# Patient Record
Sex: MALE | Race: WHITE | Employment: OTHER | URBAN - METROPOLITAN AREA
[De-identification: names, ages, dates, MRNs, and addresses within clinical notes are randomized per-mention and may not be internally consistent; named-entity substitution may affect disease eponyms.]

---

## 2018-06-18 ENCOUNTER — NURSE ONLY (OUTPATIENT)
Dept: FAMILY MEDICINE CLINIC | Facility: CLINIC | Age: 58
End: 2018-06-18

## 2018-06-18 VITALS
DIASTOLIC BLOOD PRESSURE: 74 MMHG | HEART RATE: 64 BPM | TEMPERATURE: 98 F | SYSTOLIC BLOOD PRESSURE: 118 MMHG | BODY MASS INDEX: 30 KG/M2 | OXYGEN SATURATION: 96 % | WEIGHT: 206 LBS | RESPIRATION RATE: 15 BRPM

## 2018-06-18 DIAGNOSIS — L02.91 ABSCESS: ICD-10-CM

## 2018-06-18 DIAGNOSIS — L03.311 CELLULITIS OF ABDOMINAL WALL: Primary | ICD-10-CM

## 2018-06-18 PROCEDURE — 87186 SC STD MICRODIL/AGAR DIL: CPT | Performed by: NURSE PRACTITIONER

## 2018-06-18 PROCEDURE — 87147 CULTURE TYPE IMMUNOLOGIC: CPT | Performed by: NURSE PRACTITIONER

## 2018-06-18 PROCEDURE — 99213 OFFICE O/P EST LOW 20 MIN: CPT | Performed by: NURSE PRACTITIONER

## 2018-06-18 PROCEDURE — 87205 SMEAR GRAM STAIN: CPT | Performed by: NURSE PRACTITIONER

## 2018-06-18 PROCEDURE — 87070 CULTURE OTHR SPECIMN AEROBIC: CPT | Performed by: NURSE PRACTITIONER

## 2018-06-18 RX ORDER — DOXYCYCLINE HYCLATE 100 MG
100 TABLET ORAL 2 TIMES DAILY
Qty: 14 TABLET | Refills: 0 | Status: SHIPPED | OUTPATIENT
Start: 2018-06-18 | End: 2018-06-25

## 2018-06-19 NOTE — PROGRESS NOTES
CHIEF COMPLAINT:   Patient presents with:  Lesion: Sore on LT upper abdominal area     HPI:     Lindsey Hampton is a 59year old male who presents with concerns of skin infection. Patient first noticed symptoms 7 days ago.   Reports erythema, increased warm /74   Pulse 64   Temp 98.4 °F (36.9 °C) (Oral)   Resp 15   Wt 206 lb   SpO2 96%   BMI 30.42 kg/m²   GENERAL: well developed, well nourished,in no apparent distress  SKIN: Lesion(s): crater skin lesion on left abdominal wall, circular 0.8cm, white col Cellulitis is an infection of the deep layers of skin. A break in the skin, such as a cut or scratch, can let bacteria under the skin. If the bacteria get to deep layers of the skin, it can be serious.  If not treated, cellulitis can get into the bloodstrea Date Last Reviewed: 9/1/2016  © 2052-7981 [employer]. [address] [facility], [address] [place]. All rights reserved. This information is not intended as a substitute for professional medical care.  Always follow your healthcare professional'

## 2018-06-19 NOTE — PATIENT INSTRUCTIONS
Cellulitis  Cellulitis is an infection of the deep layers of skin. A break in the skin, such as a cut or scratch, can let bacteria under the skin. If the bacteria get to deep layers of the skin, it can be serious.  If not treated, cellulitis can get into · Fever higher of 100.4º F (38.0º C) or higher after 2 days on antibiotics  Date Last Reviewed: 9/1/2016  © 6710-6376 [employer]. [facility] [facility], [address] [place] [address]. All rights reserved.  This information is not intended as a substitut

## 2018-06-25 ENCOUNTER — OFFICE VISIT (OUTPATIENT)
Dept: FAMILY MEDICINE CLINIC | Facility: CLINIC | Age: 58
End: 2018-06-25

## 2018-06-25 VITALS
BODY MASS INDEX: 30.36 KG/M2 | DIASTOLIC BLOOD PRESSURE: 70 MMHG | TEMPERATURE: 98 F | RESPIRATION RATE: 16 BRPM | HEIGHT: 69 IN | WEIGHT: 205 LBS | SYSTOLIC BLOOD PRESSURE: 124 MMHG | HEART RATE: 60 BPM | OXYGEN SATURATION: 98 %

## 2018-06-25 DIAGNOSIS — L03.311 CELLULITIS OF ABDOMINAL WALL: ICD-10-CM

## 2018-06-25 DIAGNOSIS — K63.5 POLYP OF COLON, UNSPECIFIED PART OF COLON, UNSPECIFIED TYPE: ICD-10-CM

## 2018-06-25 DIAGNOSIS — Z12.11 SCREENING FOR COLON CANCER: ICD-10-CM

## 2018-06-25 DIAGNOSIS — L02.211 ABSCESS OF ABDOMINAL WALL: Primary | ICD-10-CM

## 2018-06-25 DIAGNOSIS — Z13.29 SCREENING FOR ENDOCRINE, METABOLIC AND IMMUNITY DISORDER: ICD-10-CM

## 2018-06-25 DIAGNOSIS — Z23 NEED FOR TDAP VACCINATION: ICD-10-CM

## 2018-06-25 DIAGNOSIS — Z13.228 SCREENING FOR ENDOCRINE, METABOLIC AND IMMUNITY DISORDER: ICD-10-CM

## 2018-06-25 DIAGNOSIS — Z13.0 SCREENING FOR ENDOCRINE, METABOLIC AND IMMUNITY DISORDER: ICD-10-CM

## 2018-06-25 DIAGNOSIS — C61 PROSTATE CANCER (HCC): ICD-10-CM

## 2018-06-25 PROCEDURE — 90715 TDAP VACCINE 7 YRS/> IM: CPT | Performed by: PHYSICIAN ASSISTANT

## 2018-06-25 PROCEDURE — 99214 OFFICE O/P EST MOD 30 MIN: CPT | Performed by: PHYSICIAN ASSISTANT

## 2018-06-25 PROCEDURE — 90471 IMMUNIZATION ADMIN: CPT | Performed by: PHYSICIAN ASSISTANT

## 2018-06-25 RX ORDER — DOXYCYCLINE HYCLATE 100 MG
100 TABLET ORAL 2 TIMES DAILY
Qty: 10 TABLET | Refills: 0 | Status: SHIPPED | OUTPATIENT
Start: 2018-06-25 | End: 2018-06-30

## 2018-06-25 NOTE — PROGRESS NOTES
CHIEF COMPLAINT:   Patient presents with: Follow - Up: wound check      HPI:     Elvira Palmer is a 59year old male who presents to follow up on abdominal wound.  He was seen in our [facility] 6/18/18. 7-10 days prior he noted a pimple like lesion on the abdom Pulse 60   Temp 98.1 °F (36.7 °C) (Oral)   Resp 16   Ht 69\"   Wt 205 lb   SpO2 98%   BMI 30.27 kg/m²   GENERAL: well developed, well nourished, in no apparent distress  SKIN: open wound left upper abdomen-mild surrounding erythema. No induration.  No disch

## 2018-06-26 ENCOUNTER — LAB ENCOUNTER (OUTPATIENT)
Dept: LAB | Age: 58
End: 2018-06-26
Attending: FAMILY MEDICINE
Payer: COMMERCIAL

## 2018-06-26 DIAGNOSIS — Z13.29 SCREENING FOR ENDOCRINE, METABOLIC AND IMMUNITY DISORDER: ICD-10-CM

## 2018-06-26 DIAGNOSIS — L03.311 CELLULITIS OF ABDOMINAL WALL: ICD-10-CM

## 2018-06-26 DIAGNOSIS — L02.211 ABSCESS OF ABDOMINAL WALL: ICD-10-CM

## 2018-06-26 DIAGNOSIS — Z13.0 SCREENING FOR ENDOCRINE, METABOLIC AND IMMUNITY DISORDER: ICD-10-CM

## 2018-06-26 DIAGNOSIS — C61 PROSTATE CANCER (HCC): ICD-10-CM

## 2018-06-26 DIAGNOSIS — Z13.228 SCREENING FOR ENDOCRINE, METABOLIC AND IMMUNITY DISORDER: ICD-10-CM

## 2018-06-26 PROCEDURE — 36415 COLL VENOUS BLD VENIPUNCTURE: CPT | Performed by: PHYSICIAN ASSISTANT

## 2018-06-26 PROCEDURE — 84153 ASSAY OF PSA TOTAL: CPT | Performed by: PHYSICIAN ASSISTANT

## 2018-06-26 PROCEDURE — 80050 GENERAL HEALTH PANEL: CPT | Performed by: PHYSICIAN ASSISTANT

## 2018-06-26 PROCEDURE — 87476 LYME DIS DNA AMP PROBE: CPT | Performed by: PHYSICIAN ASSISTANT

## 2018-06-26 PROCEDURE — 82306 VITAMIN D 25 HYDROXY: CPT | Performed by: PHYSICIAN ASSISTANT

## 2018-06-26 PROCEDURE — 80061 LIPID PANEL: CPT | Performed by: PHYSICIAN ASSISTANT

## 2018-06-28 ENCOUNTER — OFFICE VISIT (OUTPATIENT)
Dept: FAMILY MEDICINE CLINIC | Facility: CLINIC | Age: 58
End: 2018-06-28

## 2018-06-28 ENCOUNTER — HOSPITAL ENCOUNTER (OUTPATIENT)
Dept: CARDIOLOGY CLINIC | Facility: HOSPITAL | Age: 58
Discharge: HOME | End: 2018-06-28
Attending: FAMILY MEDICINE

## 2018-06-28 VITALS
BODY MASS INDEX: 30.21 KG/M2 | WEIGHT: 204 LBS | OXYGEN SATURATION: 99 % | HEART RATE: 74 BPM | SYSTOLIC BLOOD PRESSURE: 122 MMHG | HEIGHT: 69 IN | DIASTOLIC BLOOD PRESSURE: 76 MMHG | TEMPERATURE: 99 F | RESPIRATION RATE: 20 BRPM

## 2018-06-28 DIAGNOSIS — Z13.6 SCREENING FOR CARDIOVASCULAR CONDITION: ICD-10-CM

## 2018-06-28 DIAGNOSIS — Z85.46 HISTORY OF PROSTATE CANCER: ICD-10-CM

## 2018-06-28 DIAGNOSIS — Z00.00 ROUTINE GENERAL MEDICAL EXAMINATION AT A HEALTH CARE FACILITY: Primary | ICD-10-CM

## 2018-06-28 DIAGNOSIS — Z12.11 SCREENING FOR COLON CANCER: ICD-10-CM

## 2018-06-28 PROBLEM — E66.9 OBESITY (BMI 30.0-34.9): Status: ACTIVE | Noted: 2018-06-28

## 2018-06-28 PROBLEM — K43.9 VENTRAL HERNIA: Status: ACTIVE | Noted: 2018-06-28

## 2018-06-28 PROCEDURE — 99396 PREV VISIT EST AGE 40-64: CPT | Performed by: FAMILY MEDICINE

## 2018-06-28 NOTE — PROGRESS NOTES
Singh Scott is a 59year old male who presents for a complete physical exam.   HPI:      Patient presents with:  Physical      Patient is present for complete physical. Feels very well. Up to date with dental visits. No hearing problems.  Vaccinations: u • Lipids Father    • Anxiety Father    • Depression Father    • Psychiatric Father      \"slight to moderate dementia\"   • Cancer Father      prostate   • Heart Disorder Father      Triple A, quadruple bypass   • Other [OTHER] Mother    • polycythemia [ PERRLA and EOMI. OP moist no lesions. TM normal, canals normal.  NECK: is supple,thyroid- normal. No carotid bruits. Heart: is RRR. S1, S2, with no murmurs. Lungs: are clear to auscultation bilaterally, with no wheeze, rhonchi, or rales.   Heart: is

## 2018-06-28 NOTE — PATIENT INSTRUCTIONS
[facility] General Surgery:       Dr. Julio César Molina    [address] [place] [address]  [place], [address]      [address]  [address]

## 2018-07-02 ENCOUNTER — TELEPHONE (OUTPATIENT)
Dept: FAMILY MEDICINE CLINIC | Facility: CLINIC | Age: 58
End: 2018-07-02

## 2018-07-02 DIAGNOSIS — E55.9 VITAMIN D DEFICIENCY: Primary | ICD-10-CM

## 2018-07-02 RX ORDER — ERGOCALCIFEROL 1.25 MG/1
50000 CAPSULE ORAL WEEKLY
Qty: 12 CAPSULE | Refills: 0 | Status: SHIPPED | OUTPATIENT
Start: 2018-07-02 | End: 2019-12-23 | Stop reason: ALTCHOICE

## 2018-07-02 NOTE — TELEPHONE ENCOUNTER
Called patient and spoke with him. Went over results and POC below. Patient states understanding. Rx sent to pharmacy and repeat lab orders placed in Epic.

## 2018-07-02 NOTE — TELEPHONE ENCOUNTER
Edwin Luke reviewed results below. However, patient had office visit with you on 6/28/18. Okay for vitamin D orders below or any changes to orders below? Please advise. Thank you!

## 2018-07-02 NOTE — TELEPHONE ENCOUNTER
----- Message from Kalpesh Couch PA-C sent at 6/30/2018  8:50 PM CDT -----  Please have patient take Rx of 50,000 units of vitamin D once per week for 3 months and then OTC maintenance dose of 5000 units OTC daily.  Recheck labs in 6 months   All othe

## 2018-07-03 ENCOUNTER — TELEPHONE (OUTPATIENT)
Dept: FAMILY MEDICINE CLINIC | Facility: CLINIC | Age: 58
End: 2018-07-03

## 2018-07-03 NOTE — TELEPHONE ENCOUNTER
----- Message from Mimi Swift MD sent at 6/29/2018 11:53 AM CDT -----  Mild plaque, just low lipid diet, no other recommendation for now, normal lipids.

## 2018-07-03 NOTE — TELEPHONE ENCOUNTER
Spoke with pt regarding results and instructions listed below. Pt requested further explanation. Pt transferred to Dr. Mohan Yancey nurse.

## 2018-07-17 ENCOUNTER — HOSPITAL ENCOUNTER (OUTPATIENT)
Dept: CT IMAGING | Age: 58
Discharge: HOME | End: 2018-07-17
Attending: FAMILY MEDICINE

## 2018-07-17 DIAGNOSIS — Z13.9 ENCOUNTER FOR SCREENING: ICD-10-CM

## 2018-07-19 ENCOUNTER — OFFICE VISIT (OUTPATIENT)
Dept: SURGERY | Facility: CLINIC | Age: 58
End: 2018-07-19
Payer: COMMERCIAL

## 2018-07-19 VITALS
SYSTOLIC BLOOD PRESSURE: 123 MMHG | HEART RATE: 66 BPM | HEIGHT: 69 IN | WEIGHT: 204 LBS | TEMPERATURE: 98 F | DIASTOLIC BLOOD PRESSURE: 78 MMHG | BODY MASS INDEX: 30.21 KG/M2

## 2018-07-19 DIAGNOSIS — Z12.11 ENCOUNTER FOR COLONOSCOPY DUE TO HISTORY OF ADENOMATOUS COLONIC POLYPS: Primary | ICD-10-CM

## 2018-07-19 DIAGNOSIS — Z86.010 ENCOUNTER FOR COLONOSCOPY DUE TO HISTORY OF ADENOMATOUS COLONIC POLYPS: Primary | ICD-10-CM

## 2018-07-19 PROCEDURE — S0285 CNSLT BEFORE SCREEN COLONOSC: HCPCS | Performed by: SURGERY

## 2018-07-19 NOTE — H&P
New Patient Visit Note       Active Problems      1.  Encounter for colonoscopy due to history of adenomatous colonic polyps        Chief Complaint   Patient presents with:  Colonoscopy: New patient referred by Presley BURGER/Dr. Kaur for colonosc social history have been reviewed by me today.     Family History   Problem Relation Age of Onset   • Hypertension Father    • Lipids Father    • Anxiety Father    • Depression Father    • Psychiatric Father      \"slight to moderate dementia\"   • Cancer F palpitations and leg swelling. Gastrointestinal: Negative for abdominal distention, abdominal pain, anal bleeding, blood in stool, constipation, diarrhea, nausea and vomiting.    Genitourinary: Negative for difficulty urinating, dysuria, frequency and urg nondiagnostic heel, incomplete exam, missed lesions, and the possibile need for further therapeutic, diagnostic, or surgical intervention.   The patient voiced understanding, and after all questions were answered to the patient's satisfaction, the patient p

## 2018-07-20 ENCOUNTER — TELEPHONE (OUTPATIENT)
Dept: FAMILY MEDICINE CLINIC | Facility: CLINIC | Age: 58
End: 2018-07-20

## 2018-07-20 DIAGNOSIS — K42.9 UMBILICAL HERNIA WITHOUT OBSTRUCTION AND WITHOUT GANGRENE: ICD-10-CM

## 2018-07-20 DIAGNOSIS — R91.8 LUNG NODULES: Primary | ICD-10-CM

## 2018-07-20 PROBLEM — Z86.010 ENCOUNTER FOR COLONOSCOPY DUE TO HISTORY OF ADENOMATOUS COLONIC POLYPS: Status: ACTIVE | Noted: 2018-07-20

## 2018-07-20 PROBLEM — Z12.11 ENCOUNTER FOR COLONOSCOPY DUE TO HISTORY OF ADENOMATOUS COLONIC POLYPS: Status: ACTIVE | Noted: 2018-07-20

## 2018-07-20 RX ORDER — POLYETHYLENE GLYCOL 3350, SODIUM CHLORIDE, SODIUM BICARBONATE, POTASSIUM CHLORIDE 420; 11.2; 5.72; 1.48 G/4L; G/4L; G/4L; G/4L
POWDER, FOR SOLUTION ORAL
Qty: 1 BOTTLE | Refills: 0 | Status: SHIPPED | OUTPATIENT
Start: 2018-07-20 | End: 2018-10-23

## 2018-07-20 NOTE — TELEPHONE ENCOUNTER
Pt can see Dr. Shailesh Maldonado for surgical opinion,  [facility]    Dr. Javier Turner    [address] [place] [address]  [place], [address]      [address]  [facility] Shoulders

## 2018-07-20 NOTE — TELEPHONE ENCOUNTER
Patient informed of information for Dr. Tanmay Bustos for opinion for hernia Dr. Juan Soto found at his last OV.

## 2018-07-20 NOTE — TELEPHONE ENCOUNTER
Patient informed of his CT scan. Dr. Judyth Fleischer- Patient states you saw a hernia just above his navel when he was in for his physical in June, but you did not give him any orders for this. He is asking what needs to be done. Please advise.

## 2018-07-20 NOTE — TELEPHONE ENCOUNTER
----- Message from Ann Simmonds, MD sent at 7/19/2018  9:28 AM CDT -----  Lung nodules, ok to do CT of the lungs no contrast in one year.

## 2018-07-26 ENCOUNTER — HOSPITAL ENCOUNTER (OUTPATIENT)
Dept: CT IMAGING | Age: 58
Discharge: HOME | End: 2018-07-26
Attending: FAMILY MEDICINE
Payer: COMMERCIAL

## 2018-07-26 DIAGNOSIS — R91.8 LUNG NODULES: ICD-10-CM

## 2018-07-26 PROCEDURE — 71250 CT THORAX DX C-: CPT | Performed by: FAMILY MEDICINE

## 2018-07-27 ENCOUNTER — OFFICE VISIT (OUTPATIENT)
Dept: SURGERY | Facility: CLINIC | Age: 58
End: 2018-07-27
Payer: COMMERCIAL

## 2018-07-27 VITALS
DIASTOLIC BLOOD PRESSURE: 90 MMHG | BODY MASS INDEX: 29.62 KG/M2 | WEIGHT: 200 LBS | HEART RATE: 76 BPM | SYSTOLIC BLOOD PRESSURE: 139 MMHG | HEIGHT: 69 IN

## 2018-07-27 DIAGNOSIS — K43.2 INCISIONAL HERNIA OF ANTERIOR ABDOMINAL WALL WITHOUT OBSTRUCTION OR GANGRENE: ICD-10-CM

## 2018-07-27 DIAGNOSIS — K43.9 VENTRAL HERNIA WITHOUT OBSTRUCTION OR GANGRENE: Primary | ICD-10-CM

## 2018-07-27 PROCEDURE — 99243 OFF/OP CNSLTJ NEW/EST LOW 30: CPT | Performed by: SURGERY

## 2018-07-27 NOTE — H&P
New Patient Visit Note       Active Problems      1. Ventral hernia without obstruction or gangrene    2.  Incisional hernia of anterior abdominal wall without obstruction or gangrene        Chief Complaint   Patient presents with:  Hernia: New pt ref by [facility] Comment:walks dog   Seat Belt               Yes         Current Outpatient Prescriptions:  PEG 3350-KCl-Na Bicarb-NaCl (TRILYTE) 420 g Oral Recon Soln Starting at 4:00 pm the night before procedure, drink 8 ounces of the prep every 15-20 minutes until fini exhibits no distension. There is no tenderness.    At the umbilicus there is a 2-3 cm defect with chronically incarcerated adipose tissue-no overlying erythema or cellulitis-no evidence of acute incarceration-supraumbilical incision status post robotic pros None    Follow Up  No Follow-up on file.     Lourdes Mcfarlane MD

## 2018-07-30 ENCOUNTER — TELEPHONE (OUTPATIENT)
Dept: FAMILY MEDICINE CLINIC | Facility: CLINIC | Age: 58
End: 2018-07-30

## 2018-08-20 RX ORDER — ACETAMINOPHEN 500 MG
1000 TABLET ORAL ONCE
Status: CANCELLED | OUTPATIENT
Start: 2018-08-20 | End: 2018-08-20

## 2018-08-20 RX ORDER — BUPRENORPHINE HCL 8 MG/1
1 TABLET SUBLINGUAL DAILY
COMMUNITY

## 2018-08-23 ENCOUNTER — TELEPHONE (OUTPATIENT)
Dept: SURGERY | Facility: CLINIC | Age: 58
End: 2018-08-23

## 2018-08-26 ENCOUNTER — ANESTHESIA EVENT (OUTPATIENT)
Dept: SURGERY | Facility: HOSPITAL | Age: 58
End: 2018-08-26
Payer: COMMERCIAL

## 2018-08-27 ENCOUNTER — ANESTHESIA (OUTPATIENT)
Dept: SURGERY | Facility: HOSPITAL | Age: 58
End: 2018-08-27
Payer: COMMERCIAL

## 2018-08-27 ENCOUNTER — HOSPITAL ENCOUNTER (OUTPATIENT)
Facility: HOSPITAL | Age: 58
Setting detail: HOSPITAL OUTPATIENT SURGERY
Discharge: HOME | End: 2018-08-27
Attending: SURGERY | Admitting: SURGERY
Payer: COMMERCIAL

## 2018-08-27 ENCOUNTER — SURGERY (OUTPATIENT)
Age: 58
End: 2018-08-27

## 2018-08-27 VITALS
BODY MASS INDEX: 30.47 KG/M2 | HEART RATE: 69 BPM | DIASTOLIC BLOOD PRESSURE: 70 MMHG | OXYGEN SATURATION: 100 % | TEMPERATURE: 98 F | HEIGHT: 69 IN | RESPIRATION RATE: 16 BRPM | SYSTOLIC BLOOD PRESSURE: 120 MMHG | WEIGHT: 205.69 LBS

## 2018-08-27 DIAGNOSIS — K43.9 VENTRAL HERNIA WITHOUT OBSTRUCTION OR GANGRENE: ICD-10-CM

## 2018-08-27 PROCEDURE — 0WUF0JZ SUPPLEMENT ABDOMINAL WALL WITH SYNTHETIC SUBSTITUTE, OPEN APPROACH: ICD-10-PCS | Performed by: SURGERY

## 2018-08-27 DEVICE — VENTRALEX ST HERNIA PATCH
Type: IMPLANTABLE DEVICE | Site: ABDOMEN | Status: FUNCTIONAL
Brand: VENTRALEX ST HERNIA PATCH

## 2018-08-27 RX ORDER — POLYMYXIN B 500000 [USP'U]/1
INJECTION, POWDER, LYOPHILIZED, FOR SOLUTION INTRAMUSCULAR; INTRATHECAL; INTRAVENOUS; OPHTHALMIC AS NEEDED
Status: DISCONTINUED | OUTPATIENT
Start: 2018-08-27 | End: 2018-08-27 | Stop reason: HOSPADM

## 2018-08-27 RX ORDER — DEXAMETHASONE SODIUM PHOSPHATE 4 MG/ML
4 VIAL (ML) INJECTION AS NEEDED
Status: DISCONTINUED | OUTPATIENT
Start: 2018-08-27 | End: 2018-08-27

## 2018-08-27 RX ORDER — HYDROCODONE BITARTRATE AND ACETAMINOPHEN 5; 325 MG/1; MG/1
1 TABLET ORAL AS NEEDED
Status: COMPLETED | OUTPATIENT
Start: 2018-08-27 | End: 2018-08-27

## 2018-08-27 RX ORDER — SODIUM CHLORIDE, SODIUM LACTATE, POTASSIUM CHLORIDE, CALCIUM CHLORIDE 600; 310; 30; 20 MG/100ML; MG/100ML; MG/100ML; MG/100ML
INJECTION, SOLUTION INTRAVENOUS CONTINUOUS
Status: DISCONTINUED | OUTPATIENT
Start: 2018-08-27 | End: 2018-08-27

## 2018-08-27 RX ORDER — NALOXONE HYDROCHLORIDE 0.4 MG/ML
80 INJECTION, SOLUTION INTRAMUSCULAR; INTRAVENOUS; SUBCUTANEOUS AS NEEDED
Status: DISCONTINUED | OUTPATIENT
Start: 2018-08-27 | End: 2018-08-27

## 2018-08-27 RX ORDER — ONDANSETRON 2 MG/ML
4 INJECTION INTRAMUSCULAR; INTRAVENOUS AS NEEDED
Status: DISCONTINUED | OUTPATIENT
Start: 2018-08-27 | End: 2018-08-27

## 2018-08-27 RX ORDER — CEFAZOLIN SODIUM/WATER 2 G/20 ML
2 SYRINGE (ML) INTRAVENOUS ONCE
Status: COMPLETED | OUTPATIENT
Start: 2018-08-27 | End: 2018-08-27

## 2018-08-27 RX ORDER — HEPARIN SODIUM 5000 [USP'U]/ML
INJECTION, SOLUTION INTRAVENOUS; SUBCUTANEOUS
Status: COMPLETED
Start: 2018-08-27 | End: 2018-08-27

## 2018-08-27 RX ORDER — LIDOCAINE HYDROCHLORIDE AND EPINEPHRINE 10; 10 MG/ML; UG/ML
INJECTION, SOLUTION INFILTRATION; PERINEURAL AS NEEDED
Status: DISCONTINUED | OUTPATIENT
Start: 2018-08-27 | End: 2018-08-27 | Stop reason: HOSPADM

## 2018-08-27 RX ORDER — BUPIVACAINE HYDROCHLORIDE 5 MG/ML
INJECTION, SOLUTION EPIDURAL; INTRACAUDAL AS NEEDED
Status: DISCONTINUED | OUTPATIENT
Start: 2018-08-27 | End: 2018-08-27 | Stop reason: HOSPADM

## 2018-08-27 RX ORDER — MORPHINE SULFATE 4 MG/ML
2 INJECTION, SOLUTION INTRAMUSCULAR; INTRAVENOUS EVERY 5 MIN PRN
Status: DISCONTINUED | OUTPATIENT
Start: 2018-08-27 | End: 2018-08-27

## 2018-08-27 RX ORDER — HYDROCODONE BITARTRATE AND ACETAMINOPHEN 5; 325 MG/1; MG/1
1 TABLET ORAL EVERY 6 HOURS PRN
Qty: 20 TABLET | Refills: 0 | Status: SHIPPED | OUTPATIENT
Start: 2018-08-27 | End: 2018-09-05 | Stop reason: ALTCHOICE

## 2018-08-27 RX ORDER — HEPARIN SODIUM 5000 [USP'U]/ML
5000 INJECTION, SOLUTION INTRAVENOUS; SUBCUTANEOUS ONCE
Status: COMPLETED | OUTPATIENT
Start: 2018-08-27 | End: 2018-08-27

## 2018-08-27 RX ORDER — CEFAZOLIN SODIUM/WATER 2 G/20 ML
SYRINGE (ML) INTRAVENOUS
Status: DISCONTINUED
Start: 2018-08-27 | End: 2018-08-27

## 2018-08-27 RX ORDER — HYDROCODONE BITARTRATE AND ACETAMINOPHEN 5; 325 MG/1; MG/1
2 TABLET ORAL AS NEEDED
Status: COMPLETED | OUTPATIENT
Start: 2018-08-27 | End: 2018-08-27

## 2018-08-27 RX ORDER — BACITRACIN 50000 [USP'U]/1
INJECTION, POWDER, LYOPHILIZED, FOR SOLUTION INTRAMUSCULAR AS NEEDED
Status: DISCONTINUED | OUTPATIENT
Start: 2018-08-27 | End: 2018-08-27 | Stop reason: HOSPADM

## 2018-08-27 NOTE — ANESTHESIA PREPROCEDURE EVALUATION
PRE-OP EVALUATION    Patient Name: Sloane Hood    Pre-op Diagnosis: Ventral hernia without obstruction or gangrene [K43.9]    Procedure(s):  VENTRAL HERNIA REPAIR WITH MESH    Surgeon(s) and Role:     Shwetha Reddy MD - Primary    Pre-op vitals re Smoking status: Never Smoker    Smokeless tobacco: Never Used    Alcohol use Yes    Comment: 0-6 per month       Drug use: No     Available pre-op labs reviewed. Lab Results  Component Value Date   WBC 5.0 06/26/2018   RBC 4.73 06/26/2018   HGB 15.

## 2018-08-27 NOTE — OPERATIVE REPORT
[facility]  Operative Note    Kanika Olivares Location: OR   CSN [id number] MRN [id number]   Admission Date 8/27/2018 Operation Date 8/27/2018   Attending Physician Stacy Fuentes MD Operating Physician Shalini Loza MD     Date of procedure:    Augu patient and there are no further questions at this time and they do wish to proceed with surgery today. The patient was marked in the preoperative holding area and the marking was confirmed by the patient. Summary of case:    The patient was taken to t

## 2018-08-27 NOTE — ANESTHESIA POSTPROCEDURE EVALUATION
[address] Patient Status:  Hospital Outpatient Surgery   Age/Gender 59year old male MRN [id number]   [facility] SURGERY Attending Dora Merino MD   Hosp Day # 0 PCP Xiomy Rebollar MD       Anesthesia Post-op Not

## 2018-08-27 NOTE — PROGRESS NOTES
Patient received to PACU [place] # 22 . Pt sleepy but arousable to voice. Report from Anesthesiologist and Circulating RN received while patient placed on monitors and vitals taken. Introductions made.  Pt re-oriented to time/place and surroundings and Plan of

## 2018-08-27 NOTE — BRIEF OP NOTE
Pre-Operative Diagnosis: chronically incarcerated incisional hernia     Post-Operative Diagnosis:   Same- 2 cm defect      Procedure Performed:   Procedure(s):  VENTRAL HERNIA REPAIR WITH MESH- 4 cm ventralux    Surgeon(s) and Role:     Jennifer Jimenez,

## 2018-08-27 NOTE — H&P
History & Physical Examination    Patient Name: Geovanny Mcmullen  MRN: [id number]  CSN: [id number]  YOB: 1960    Diagnosis: chronically incarcerated incisional hernia    Present Illness: 77-year-old gentleman who is status post robotic prostatec contacts     Past Surgical History:  No date: COLONOSCOPY  05/2013: LAPAROSCOPY, SURGICAL PROSTATECTOMY, RETROPUBI*      Comment: Prostatectomy @ [facility]  2013: PROSTATE BIOPSIES      Comment: thinks had 1 biopsy positive out of 12  No date:

## 2018-08-27 NOTE — INTERVAL H&P NOTE
Pre-op Diagnosis: Ventral hernia without obstruction or gangrene [K43.9]    The above referenced H&P was reviewed by Jose M Moraes MD on 8/27/2018, the patient was examined and no significant changes have occurred in the patient's condition since the H&P

## 2018-09-05 ENCOUNTER — OFFICE VISIT (OUTPATIENT)
Dept: SURGERY | Facility: CLINIC | Age: 58
End: 2018-09-05

## 2018-09-05 VITALS
WEIGHT: 200 LBS | TEMPERATURE: 98 F | BODY MASS INDEX: 29.62 KG/M2 | SYSTOLIC BLOOD PRESSURE: 132 MMHG | RESPIRATION RATE: 16 BRPM | HEIGHT: 69 IN | DIASTOLIC BLOOD PRESSURE: 80 MMHG | OXYGEN SATURATION: 99 % | HEART RATE: 56 BPM

## 2018-09-05 DIAGNOSIS — K43.9 VENTRAL HERNIA WITHOUT OBSTRUCTION OR GANGRENE: Primary | ICD-10-CM

## 2018-09-05 PROCEDURE — 99024 POSTOP FOLLOW-UP VISIT: CPT | Performed by: PHYSICIAN ASSISTANT

## 2018-09-05 NOTE — PROGRESS NOTES
Post Operative Visit Note       Active Problems  1. Ventral hernia without obstruction or gangrene         Chief Complaint   Patient presents with:  Post-Op: Post op visit--ventral hernia repair done 8/27. PT denies any current issues or concerns.     Histo quadruple bypass   • Other [OTHER] Mother    • polycythemia [OTHER] Mother    • Suicide History Other      completed suicide   • Suicide History Paternal Aunt      completed suicide   • Other [OTHER] Paternal Aunt      Alzheimers   • Psychiatric Paternal G distention, abdominal pain, anal bleeding, blood in stool, constipation, diarrhea, nausea and vomiting. Genitourinary: Negative for difficulty urinating, dysuria, frequency and urgency. Musculoskeletal: Negative for arthralgias and myalgias.    Skin: Ne lifting greater than 50 pounds for 3 months following his procedure. The patient may shower like normal lying soap and water to run over his incision site. He should avoid submerging the incision in water for 1 week.     I have no further follow-up sche

## 2018-09-18 ENCOUNTER — TELEPHONE (OUTPATIENT)
Dept: SURGERY | Facility: CLINIC | Age: 58
End: 2018-09-18

## 2018-10-23 ENCOUNTER — OFFICE VISIT (OUTPATIENT)
Dept: SURGERY | Facility: CLINIC | Age: 58
End: 2018-10-23

## 2018-10-23 VITALS
WEIGHT: 210 LBS | DIASTOLIC BLOOD PRESSURE: 77 MMHG | RESPIRATION RATE: 16 BRPM | BODY MASS INDEX: 31 KG/M2 | TEMPERATURE: 97 F | HEART RATE: 63 BPM | SYSTOLIC BLOOD PRESSURE: 137 MMHG

## 2018-10-23 DIAGNOSIS — K43.9 VENTRAL HERNIA WITHOUT OBSTRUCTION OR GANGRENE: Primary | ICD-10-CM

## 2018-10-23 PROCEDURE — 99024 POSTOP FOLLOW-UP VISIT: CPT | Performed by: PHYSICIAN ASSISTANT

## 2018-10-23 NOTE — PROGRESS NOTES
Post Operative Visit Note       Active Problems  1. Ventral hernia without obstruction or gangrene         Chief Complaint   Patient presents with:  Post-Op: 8/27/18 ventral hernia surgery [facility].  Pt states 'believes has possibly a stitch or the mesh very sma Father         \"slight to moderate dementia\"   • Cancer Father         prostate   • Heart Disorder Father         Triple A, quadruple bypass   • Other (Other) Mother    • Other (polycythemia) Mother    • Suicide History Other         completed suicide Dysfunction. Disp: 10 tablet Rfl: 11   ergocalciferol 62429 units Oral Cap Take 1 capsule (50,000 Units total) by mouth once a week. Disp: 12 capsule Rfl: 0         Review of Systems  The Review of Systems has been reviewed by me during today.   Review of S There is a very small Vicryl suture not present just above the umbilicus. The area was cleansed with sterile alcohol and using a tissue forceps the Vicryl knot was removed from the incision.    Neurological: He is alert and oriented to person, place, and t

## 2019-12-14 ENCOUNTER — HOSPITAL ENCOUNTER (EMERGENCY)
Age: 59
Discharge: HOME | End: 2019-12-14
Attending: EMERGENCY MEDICINE
Payer: COMMERCIAL

## 2019-12-14 ENCOUNTER — APPOINTMENT (OUTPATIENT)
Dept: GENERAL RADIOLOGY | Age: 59
End: 2019-12-14
Attending: PHYSICIAN ASSISTANT
Payer: COMMERCIAL

## 2019-12-14 VITALS
OXYGEN SATURATION: 99 % | WEIGHT: 200 LBS | BODY MASS INDEX: 29.62 KG/M2 | RESPIRATION RATE: 17 BRPM | TEMPERATURE: 97 F | HEIGHT: 69 IN | DIASTOLIC BLOOD PRESSURE: 85 MMHG | SYSTOLIC BLOOD PRESSURE: 163 MMHG | HEART RATE: 81 BPM

## 2019-12-14 DIAGNOSIS — S92.015A CLOSED NONDISPLACED FRACTURE OF BODY OF LEFT CALCANEUS, INITIAL ENCOUNTER: Primary | ICD-10-CM

## 2019-12-14 PROCEDURE — 99284 EMERGENCY DEPT VISIT MOD MDM: CPT

## 2019-12-14 PROCEDURE — 72100 X-RAY EXAM L-S SPINE 2/3 VWS: CPT | Performed by: PHYSICIAN ASSISTANT

## 2019-12-14 PROCEDURE — 73610 X-RAY EXAM OF ANKLE: CPT | Performed by: PHYSICIAN ASSISTANT

## 2019-12-14 PROCEDURE — 73630 X-RAY EXAM OF FOOT: CPT | Performed by: PHYSICIAN ASSISTANT

## 2019-12-14 PROCEDURE — 29515 APPLICATION SHORT LEG SPLINT: CPT

## 2019-12-14 RX ORDER — HYDROCODONE BITARTRATE AND ACETAMINOPHEN 5; 325 MG/1; MG/1
1-2 TABLET ORAL EVERY 6 HOURS PRN
Qty: 10 TABLET | Refills: 0 | Status: SHIPPED | OUTPATIENT
Start: 2019-12-14 | End: 2019-12-14 | Stop reason: CLARIF

## 2019-12-14 RX ORDER — IBUPROFEN 600 MG/1
600 TABLET ORAL ONCE
Status: COMPLETED | OUTPATIENT
Start: 2019-12-14 | End: 2019-12-14

## 2019-12-14 RX ORDER — HYDROCODONE BITARTRATE AND ACETAMINOPHEN 5; 325 MG/1; MG/1
1-2 TABLET ORAL EVERY 6 HOURS PRN
Qty: 10 TABLET | Refills: 0 | Status: SHIPPED | OUTPATIENT
Start: 2019-12-14 | End: 2019-12-21

## 2019-12-15 NOTE — ED PROVIDER NOTES
Patient Seen in: [facility] [place]      History   Patient presents with:  Lower Extremity Injury    Stated Complaint: fall- L foot inj    HPI    CHIEF COMPLAINT: Foot and ankle injury status post fall    HISTORY OF PRESENT ILLNESS Robot Assisted Prostatectomy @ Dr. Zeke Acosta, [place] 3+4, bilateral, T2cNx (no LN in path report), neg margin, no FRANKLYN   • PROSTATE BIOPSIES  2013    thinks had 1 biopsy positive out of 12   • REPAIR ING HERNIA,5+Y/O,REDUCIBL     • SI General: Skin is warm and dry. Neurological:      Mental Status: He is alert and oriented to person, place, and time.        ED Course   Labs Reviewed - No data to display    Xr Ankle (min 3 Views), Left (cpt=73610)    Result Date: 12/14/2019  CONCLUSI Patient was screened and evaluated during this visit. I determined, within reasonable clinical confidence and prior to discharge, that an emergency medical condition was not or was no longer present.   There was no indication for further evaluation, treat

## 2019-12-16 NOTE — H&P (VIEW-ONLY)
Orthopaedic Foot & Ankle Surgery  [facility]  New Fracture H & P Note  Patient:  Evelin Boykin   :  1960 - 62year old male   [address] 57 Record: [id number]  Date of Service:  2019   CHIEF COMPLAINT / REASON FOR VISIT   This is a [place], Dr. Eneida Hu, [place] 3+4, bilateral, T2cNx (no LN in path report), neg margin, no FRANKLYN   • PROSTATE BIOPSIES  2013    thinks had 1 biopsy positive out of 12   • REPAIR ING HERNIA,5+Y/O,REDUCIBL     • SINUS SURGERY         Social and F discharge. No scleral icterus. Neck: Neck supple. No JVD present. No tracheal deviation present. No thyromegaly present. Cardiovascular: Normal rate and regular rhythm. Pulmonary/Chest: Effort normal. No stridor. No respiratory distress.  She has no w this encounter, I spent more than 45 minutes face-to-face with the patient.  The majority of the time was spent counseling the patient on the natural history of their diagnosis, the associated risks and benefits and other issues pertinent to their condition

## 2019-12-23 ENCOUNTER — TELEPHONE (OUTPATIENT)
Dept: FAMILY MEDICINE CLINIC | Facility: CLINIC | Age: 59
End: 2019-12-23

## 2019-12-23 RX ORDER — CHLORAL HYDRATE 500 MG
1000 CAPSULE ORAL DAILY
Status: ON HOLD | COMMUNITY
End: 2019-12-28

## 2019-12-23 RX ORDER — CEFAZOLIN SODIUM/WATER 2 G/20 ML
2 SYRINGE (ML) INTRAVENOUS ONCE
Status: CANCELLED | OUTPATIENT
Start: 2019-12-23 | End: 2019-12-23

## 2019-12-23 NOTE — TELEPHONE ENCOUNTER
Patient is undergoing a surgery on 12/27/19, Dr. Mildred Rogers, Pt's pre-op has been scheduled with Dr. Rigo Randall for 12/24/19 as Dr. Cee García is out of the office until 12/26. Pink sheet completed and forwarded to Dr. Rigo Randall.

## 2019-12-24 ENCOUNTER — TELEPHONE (OUTPATIENT)
Dept: FAMILY MEDICINE CLINIC | Facility: CLINIC | Age: 59
End: 2019-12-24

## 2019-12-24 ENCOUNTER — OFFICE VISIT (OUTPATIENT)
Dept: FAMILY MEDICINE CLINIC | Facility: CLINIC | Age: 59
End: 2019-12-24
Payer: COMMERCIAL

## 2019-12-24 VITALS
RESPIRATION RATE: 16 BRPM | WEIGHT: 204 LBS | SYSTOLIC BLOOD PRESSURE: 130 MMHG | OXYGEN SATURATION: 97 % | BODY MASS INDEX: 30.21 KG/M2 | DIASTOLIC BLOOD PRESSURE: 70 MMHG | HEIGHT: 69 IN | HEART RATE: 92 BPM | TEMPERATURE: 98 F

## 2019-12-24 DIAGNOSIS — Z01.818 PREOPERATIVE GENERAL PHYSICAL EXAMINATION: Primary | ICD-10-CM

## 2019-12-24 DIAGNOSIS — Z85.46 HISTORY OF PROSTATE CANCER: ICD-10-CM

## 2019-12-24 DIAGNOSIS — Z00.00 LABORATORY EXAMINATION ORDERED AS PART OF A ROUTINE GENERAL MEDICAL EXAMINATION: ICD-10-CM

## 2019-12-24 DIAGNOSIS — S92.002G CLOSED DISPLACED FRACTURE OF LEFT CALCANEUS WITH DELAYED HEALING, UNSPECIFIED PORTION OF CALCANEUS, SUBSEQUENT ENCOUNTER: ICD-10-CM

## 2019-12-24 PROCEDURE — 99214 OFFICE O/P EST MOD 30 MIN: CPT | Performed by: FAMILY MEDICINE

## 2019-12-24 NOTE — PROGRESS NOTES
Cherelle Villalobos is a 62year old male who presents for a pre-operative physical exam.   HPI related to surgery:   Cherelle Villalobos is scheduled for   Open treatment of left calcaneous fracture at [facility] on 12/27/19  to be performed by Dr Crow Cueto History    Tobacco Use      Smoking status: Never Smoker      Smokeless tobacco: Never Used    Alcohol use: Yes      Frequency: Monthly or less      Drinks per session: 1 or 2      Binge frequency: Never      Comment: 0-6 per month    Drug use: No     No K rebound tenderness  Neurologic: Alert and oriented x 3. No focal neurological deficits. Musculoskeletal: Full range of motion of all extremities. No swelling noted. Integument: No lesions. No erythema. Psychiatric: Appropriate mood and affect.     ASSE

## 2019-12-26 ENCOUNTER — LAB ENCOUNTER (OUTPATIENT)
Dept: LAB | Age: 59
End: 2019-12-26
Attending: FAMILY MEDICINE
Payer: COMMERCIAL

## 2019-12-26 DIAGNOSIS — Z00.00 LABORATORY EXAMINATION ORDERED AS PART OF A ROUTINE GENERAL MEDICAL EXAMINATION: ICD-10-CM

## 2019-12-26 DIAGNOSIS — Z85.46 HISTORY OF PROSTATE CANCER: ICD-10-CM

## 2019-12-26 PROCEDURE — 80050 GENERAL HEALTH PANEL: CPT | Performed by: FAMILY MEDICINE

## 2019-12-26 PROCEDURE — 36415 COLL VENOUS BLD VENIPUNCTURE: CPT | Performed by: FAMILY MEDICINE

## 2019-12-26 PROCEDURE — 84153 ASSAY OF PSA TOTAL: CPT | Performed by: FAMILY MEDICINE

## 2019-12-26 PROCEDURE — 80061 LIPID PANEL: CPT | Performed by: FAMILY MEDICINE

## 2019-12-27 ENCOUNTER — ANESTHESIA (OUTPATIENT)
Dept: SURGERY | Facility: HOSPITAL | Age: 59
End: 2019-12-27
Payer: COMMERCIAL

## 2019-12-27 ENCOUNTER — HOSPITAL ENCOUNTER (OUTPATIENT)
Facility: HOSPITAL | Age: 59
Discharge: HOME | End: 2019-12-28
Attending: ORTHOPAEDIC SURGERY | Admitting: ORTHOPAEDIC SURGERY
Payer: COMMERCIAL

## 2019-12-27 ENCOUNTER — APPOINTMENT (OUTPATIENT)
Dept: GENERAL RADIOLOGY | Facility: HOSPITAL | Age: 59
End: 2019-12-27
Attending: ORTHOPAEDIC SURGERY
Payer: COMMERCIAL

## 2019-12-27 ENCOUNTER — ANESTHESIA EVENT (OUTPATIENT)
Dept: SURGERY | Facility: HOSPITAL | Age: 59
End: 2019-12-27
Payer: COMMERCIAL

## 2019-12-27 PROBLEM — S92.002A CALCANEUS FRACTURE, LEFT: Status: ACTIVE | Noted: 2019-12-27

## 2019-12-27 PROCEDURE — 76000 FLUOROSCOPY <1 HR PHYS/QHP: CPT | Performed by: ORTHOPAEDIC SURGERY

## 2019-12-27 PROCEDURE — 0QSM04Z REPOSITION LEFT TARSAL WITH INTERNAL FIXATION DEVICE, OPEN APPROACH: ICD-10-PCS | Performed by: ORTHOPAEDIC SURGERY

## 2019-12-27 DEVICE — IMPLANTABLE DEVICE: Type: IMPLANTABLE DEVICE | Status: FUNCTIONAL

## 2019-12-27 RX ORDER — ACETAMINOPHEN 325 MG/1
650 TABLET ORAL EVERY 6 HOURS PRN
Status: DISCONTINUED | OUTPATIENT
Start: 2019-12-27 | End: 2019-12-28

## 2019-12-27 RX ORDER — ACETAMINOPHEN 500 MG
1000 TABLET ORAL EVERY 6 HOURS PRN
COMMUNITY
End: 2021-12-06 | Stop reason: ALTCHOICE

## 2019-12-27 RX ORDER — MORPHINE SULFATE 4 MG/ML
1 INJECTION, SOLUTION INTRAMUSCULAR; INTRAVENOUS EVERY 4 HOURS PRN
Status: DISCONTINUED | OUTPATIENT
Start: 2019-12-27 | End: 2019-12-28

## 2019-12-27 RX ORDER — ONDANSETRON 2 MG/ML
INJECTION INTRAMUSCULAR; INTRAVENOUS AS NEEDED
Status: DISCONTINUED | OUTPATIENT
Start: 2019-12-27 | End: 2019-12-27 | Stop reason: SURG

## 2019-12-27 RX ORDER — GLYCOPYRROLATE 0.2 MG/ML
INJECTION, SOLUTION INTRAMUSCULAR; INTRAVENOUS AS NEEDED
Status: DISCONTINUED | OUTPATIENT
Start: 2019-12-27 | End: 2019-12-27 | Stop reason: SURG

## 2019-12-27 RX ORDER — DIPHENHYDRAMINE HYDROCHLORIDE 50 MG/ML
12.5 INJECTION INTRAMUSCULAR; INTRAVENOUS AS NEEDED
Status: DISCONTINUED | OUTPATIENT
Start: 2019-12-27 | End: 2019-12-27 | Stop reason: HOSPADM

## 2019-12-27 RX ORDER — EPHEDRINE SULFATE 50 MG/ML
INJECTION, SOLUTION INTRAVENOUS AS NEEDED
Status: DISCONTINUED | OUTPATIENT
Start: 2019-12-27 | End: 2019-12-27 | Stop reason: SURG

## 2019-12-27 RX ORDER — LIDOCAINE HYDROCHLORIDE 10 MG/ML
INJECTION, SOLUTION EPIDURAL; INFILTRATION; INTRACAUDAL; PERINEURAL AS NEEDED
Status: DISCONTINUED | OUTPATIENT
Start: 2019-12-27 | End: 2019-12-27 | Stop reason: SURG

## 2019-12-27 RX ORDER — DEXAMETHASONE SODIUM PHOSPHATE 4 MG/ML
4 VIAL (ML) INJECTION AS NEEDED
Status: DISCONTINUED | OUTPATIENT
Start: 2019-12-27 | End: 2019-12-27 | Stop reason: HOSPADM

## 2019-12-27 RX ORDER — HYDROCODONE BITARTRATE AND ACETAMINOPHEN 5; 325 MG/1; MG/1
1 TABLET ORAL AS NEEDED
Status: DISCONTINUED | OUTPATIENT
Start: 2019-12-27 | End: 2019-12-27 | Stop reason: HOSPADM

## 2019-12-27 RX ORDER — HYDROCODONE BITARTRATE AND ACETAMINOPHEN 5; 325 MG/1; MG/1
2 TABLET ORAL AS NEEDED
Status: DISCONTINUED | OUTPATIENT
Start: 2019-12-27 | End: 2019-12-27 | Stop reason: HOSPADM

## 2019-12-27 RX ORDER — SODIUM CHLORIDE, SODIUM LACTATE, POTASSIUM CHLORIDE, CALCIUM CHLORIDE 600; 310; 30; 20 MG/100ML; MG/100ML; MG/100ML; MG/100ML
INJECTION, SOLUTION INTRAVENOUS CONTINUOUS
Status: DISCONTINUED | OUTPATIENT
Start: 2019-12-27 | End: 2019-12-28

## 2019-12-27 RX ORDER — ROPIVACAINE HYDROCHLORIDE 5 MG/ML
INJECTION, SOLUTION EPIDURAL; INFILTRATION; PERINEURAL AS NEEDED
Status: DISCONTINUED | OUTPATIENT
Start: 2019-12-27 | End: 2019-12-27 | Stop reason: SURG

## 2019-12-27 RX ORDER — CEFAZOLIN SODIUM/WATER 2 G/20 ML
2 SYRINGE (ML) INTRAVENOUS EVERY 8 HOURS
Status: COMPLETED | OUTPATIENT
Start: 2019-12-27 | End: 2019-12-28

## 2019-12-27 RX ORDER — ESMOLOL HYDROCHLORIDE 10 MG/ML
INJECTION INTRAVENOUS AS NEEDED
Status: DISCONTINUED | OUTPATIENT
Start: 2019-12-27 | End: 2019-12-27 | Stop reason: SURG

## 2019-12-27 RX ORDER — DOCUSATE SODIUM 100 MG/1
100 CAPSULE, LIQUID FILLED ORAL 2 TIMES DAILY
Status: DISCONTINUED | OUTPATIENT
Start: 2019-12-27 | End: 2019-12-28

## 2019-12-27 RX ORDER — ENOXAPARIN SODIUM 100 MG/ML
40 INJECTION SUBCUTANEOUS DAILY
Status: DISCONTINUED | OUTPATIENT
Start: 2019-12-28 | End: 2019-12-28

## 2019-12-27 RX ORDER — NALOXONE HYDROCHLORIDE 0.4 MG/ML
80 INJECTION, SOLUTION INTRAMUSCULAR; INTRAVENOUS; SUBCUTANEOUS AS NEEDED
Status: DISCONTINUED | OUTPATIENT
Start: 2019-12-27 | End: 2019-12-27 | Stop reason: HOSPADM

## 2019-12-27 RX ORDER — DEXTROSE AND SODIUM CHLORIDE 5; .45 G/100ML; G/100ML
INJECTION, SOLUTION INTRAVENOUS CONTINUOUS
Status: DISCONTINUED | OUTPATIENT
Start: 2019-12-27 | End: 2019-12-28

## 2019-12-27 RX ORDER — LABETALOL HYDROCHLORIDE 5 MG/ML
INJECTION, SOLUTION INTRAVENOUS
Status: DISCONTINUED
Start: 2019-12-27 | End: 2019-12-27 | Stop reason: WASHOUT

## 2019-12-27 RX ORDER — SODIUM CHLORIDE, SODIUM LACTATE, POTASSIUM CHLORIDE, CALCIUM CHLORIDE 600; 310; 30; 20 MG/100ML; MG/100ML; MG/100ML; MG/100ML
INJECTION, SOLUTION INTRAVENOUS CONTINUOUS
Status: DISCONTINUED | OUTPATIENT
Start: 2019-12-27 | End: 2019-12-27 | Stop reason: HOSPADM

## 2019-12-27 RX ORDER — ZOLPIDEM TARTRATE 5 MG/1
5 TABLET ORAL NIGHTLY PRN
Status: DISCONTINUED | OUTPATIENT
Start: 2019-12-27 | End: 2019-12-28

## 2019-12-27 RX ORDER — ACETAMINOPHEN 500 MG
1000 TABLET ORAL ONCE
Status: DISCONTINUED | OUTPATIENT
Start: 2019-12-27 | End: 2019-12-27 | Stop reason: HOSPADM

## 2019-12-27 RX ORDER — MIDAZOLAM HYDROCHLORIDE 1 MG/ML
INJECTION INTRAMUSCULAR; INTRAVENOUS AS NEEDED
Status: DISCONTINUED | OUTPATIENT
Start: 2019-12-27 | End: 2019-12-27 | Stop reason: SURG

## 2019-12-27 RX ORDER — ONDANSETRON 2 MG/ML
4 INJECTION INTRAMUSCULAR; INTRAVENOUS EVERY 6 HOURS PRN
Status: DISCONTINUED | OUTPATIENT
Start: 2019-12-27 | End: 2019-12-28

## 2019-12-27 RX ORDER — CEFAZOLIN SODIUM/WATER 2 G/20 ML
2 SYRINGE (ML) INTRAVENOUS ONCE
Status: COMPLETED | OUTPATIENT
Start: 2019-12-27 | End: 2019-12-27

## 2019-12-27 RX ORDER — ENOXAPARIN SODIUM 100 MG/ML
40 INJECTION SUBCUTANEOUS DAILY
Status: DISCONTINUED | OUTPATIENT
Start: 2019-12-28 | End: 2019-12-27

## 2019-12-27 RX ORDER — KETAMINE HYDROCHLORIDE 50 MG/ML
INJECTION, SOLUTION, CONCENTRATE INTRAMUSCULAR; INTRAVENOUS AS NEEDED
Status: DISCONTINUED | OUTPATIENT
Start: 2019-12-27 | End: 2019-12-27 | Stop reason: SURG

## 2019-12-27 RX ORDER — ONDANSETRON 2 MG/ML
4 INJECTION INTRAMUSCULAR; INTRAVENOUS AS NEEDED
Status: DISCONTINUED | OUTPATIENT
Start: 2019-12-27 | End: 2019-12-27 | Stop reason: HOSPADM

## 2019-12-27 RX ORDER — HYDROMORPHONE HYDROCHLORIDE 1 MG/ML
0.4 INJECTION, SOLUTION INTRAMUSCULAR; INTRAVENOUS; SUBCUTANEOUS EVERY 5 MIN PRN
Status: DISCONTINUED | OUTPATIENT
Start: 2019-12-27 | End: 2019-12-27 | Stop reason: HOSPADM

## 2019-12-27 RX ORDER — LABETALOL HYDROCHLORIDE 5 MG/ML
5 INJECTION, SOLUTION INTRAVENOUS EVERY 5 MIN PRN
Status: DISCONTINUED | OUTPATIENT
Start: 2019-12-27 | End: 2019-12-27 | Stop reason: HOSPADM

## 2019-12-27 RX ORDER — OXYCODONE HYDROCHLORIDE 5 MG/1
5 TABLET ORAL EVERY 6 HOURS PRN
Status: DISCONTINUED | OUTPATIENT
Start: 2019-12-27 | End: 2019-12-28

## 2019-12-27 RX ORDER — MIDAZOLAM HYDROCHLORIDE 1 MG/ML
1 INJECTION INTRAMUSCULAR; INTRAVENOUS EVERY 5 MIN PRN
Status: DISCONTINUED | OUTPATIENT
Start: 2019-12-27 | End: 2019-12-27 | Stop reason: HOSPADM

## 2019-12-27 RX ORDER — PHENYLEPHRINE HCL 10 MG/ML
VIAL (ML) INJECTION AS NEEDED
Status: DISCONTINUED | OUTPATIENT
Start: 2019-12-27 | End: 2019-12-27 | Stop reason: SURG

## 2019-12-27 RX ADMIN — ESMOLOL HYDROCHLORIDE 30 MG: 10 INJECTION INTRAVENOUS at 16:15:00

## 2019-12-27 RX ADMIN — EPHEDRINE SULFATE 5 MG: 50 INJECTION, SOLUTION INTRAVENOUS at 14:05:00

## 2019-12-27 RX ADMIN — SODIUM CHLORIDE, SODIUM LACTATE, POTASSIUM CHLORIDE, CALCIUM CHLORIDE: 600; 310; 30; 20 INJECTION, SOLUTION INTRAVENOUS at 14:40:00

## 2019-12-27 RX ADMIN — PHENYLEPHRINE HCL 100 MCG: 10 MG/ML VIAL (ML) INJECTION at 13:57:00

## 2019-12-27 RX ADMIN — ONDANSETRON 4 MG: 2 INJECTION INTRAMUSCULAR; INTRAVENOUS at 16:02:00

## 2019-12-27 RX ADMIN — ESMOLOL HYDROCHLORIDE 30 MG: 10 INJECTION INTRAVENOUS at 14:30:00

## 2019-12-27 RX ADMIN — GLYCOPYRROLATE 0.1 MG: 0.2 INJECTION, SOLUTION INTRAMUSCULAR; INTRAVENOUS at 13:41:00

## 2019-12-27 RX ADMIN — ROPIVACAINE HYDROCHLORIDE 30 ML: 5 INJECTION, SOLUTION EPIDURAL; INFILTRATION; PERINEURAL at 16:43:00

## 2019-12-27 RX ADMIN — SODIUM CHLORIDE, SODIUM LACTATE, POTASSIUM CHLORIDE, CALCIUM CHLORIDE: 600; 310; 30; 20 INJECTION, SOLUTION INTRAVENOUS at 16:50:00

## 2019-12-27 RX ADMIN — MIDAZOLAM HYDROCHLORIDE 2 MG: 1 INJECTION INTRAMUSCULAR; INTRAVENOUS at 13:26:00

## 2019-12-27 RX ADMIN — CEFAZOLIN SODIUM/WATER 2 G: 2 G/20 ML SYRINGE (ML) INTRAVENOUS at 13:39:00

## 2019-12-27 RX ADMIN — ESMOLOL HYDROCHLORIDE 30 MG: 10 INJECTION INTRAVENOUS at 16:01:00

## 2019-12-27 RX ADMIN — KETAMINE HYDROCHLORIDE 20 MG: 50 INJECTION, SOLUTION, CONCENTRATE INTRAMUSCULAR; INTRAVENOUS at 13:41:00

## 2019-12-27 RX ADMIN — LIDOCAINE HYDROCHLORIDE 100 MG: 10 INJECTION, SOLUTION EPIDURAL; INFILTRATION; INTRACAUDAL; PERINEURAL at 13:41:00

## 2019-12-27 NOTE — INTERVAL H&P NOTE
Pre-op Diagnosis: CLOSED DISPLACED INTR-ARTICULAR FRACTURE OF LEFT CALCANEOUS    The above referenced H&P was reviewed by Andrzej Jaramillo MD on 12/27/2019, the patient was examined and no significant changes have occurred in the patient's condition since t

## 2019-12-27 NOTE — ANESTHESIA PREPROCEDURE EVALUATION
PRE-OP EVALUATION    Patient Name: Vj Houston    Pre-op Diagnosis: CLOSED DISPLACED INTR-ARTICULAR FRACTURE OF LEFT CALCANEOUS    Procedure(s):  OPEN TREATMENT OF LEFT CALCANEUS FRACTURE    Surgeon(s) and Role:     * Ferdinand Baron MD - Primary    P T2cNx (no LN in path report), neg margin, no FRANKLYN   • PROSTATE BIOPSIES  2013    thinks had 1 biopsy positive out of 12   • REPAIR ING HERNIA,5+Y/O,REDUCIBL     • SINUS SURGERY    02/2008     Social History    Tobacco Use      Smoking status: Never Smoker patient                Present on Admission:  **None**

## 2019-12-27 NOTE — ANESTHESIA PROCEDURE NOTES
[facility]  Performed by: Dg Yang DO  Authorized by: Dg Yang DO       General Information and Staff    Start Time:  12/27/2019 4:38 PM  Anesthesiologist:  Dg Yang DO  Performed by:   Anesthesiologist  Patient Location:

## 2019-12-27 NOTE — INTERVAL H&P NOTE
Pre-op Diagnosis: CLOSED DISPLACED INTR-ARTICULAR FRACTURE OF LEFT CALCANEOUS    The above referenced H&P was reviewed by Shira Hamilton MD on 12/27/2019, the patient was examined and no significant changes have occurred in the patient's condition since t

## 2019-12-27 NOTE — ANESTHESIA POSTPROCEDURE EVALUATION
[address] Patient Status:  Outpatient in a Bed   Age/Gender 62year old male MRN [id number]   [facility] SURGERY Attending Juan Velazquez MD   Hosp Day # 0 PCP Doris Arroyo MD       Anesthesia Post-op Note    Pro

## 2019-12-27 NOTE — BRIEF OP NOTE
Pre-Operative Diagnosis: CLOSED DISPLACED INTR-ARTICULAR FRACTURE OF LEFT CALCANEOUS     Post-Operative Diagnosis: CLOSED DISPLACED INTR-ARTICULAR FRACTURE OF LEFT CALCANEOUS      Procedure Performed:   Procedure(s):  OPEN TREATMENT OF LEFT CALCANEUS FRACT

## 2019-12-27 NOTE — ANESTHESIA PROCEDURE NOTES
Airway  Urgency: elective      General Information and Staff    Patient location during procedure: OR  Anesthesiologist: Dg Yang DO  Performed: anesthesiologist     Indications and Patient Condition  Indications for airway management: anesthesia

## 2019-12-28 VITALS
BODY MASS INDEX: 29.33 KG/M2 | OXYGEN SATURATION: 97 % | SYSTOLIC BLOOD PRESSURE: 146 MMHG | RESPIRATION RATE: 18 BRPM | HEIGHT: 69 IN | TEMPERATURE: 98 F | DIASTOLIC BLOOD PRESSURE: 78 MMHG | WEIGHT: 198 LBS | HEART RATE: 86 BPM

## 2019-12-28 PROCEDURE — 97161 PT EVAL LOW COMPLEX 20 MIN: CPT

## 2019-12-28 PROCEDURE — 90471 IMMUNIZATION ADMIN: CPT

## 2019-12-28 RX ORDER — ASPIRIN 81 MG/1
81 TABLET ORAL DAILY
Qty: 30 TABLET | Refills: 0 | Status: SHIPPED | COMMUNITY
Start: 2019-12-28 | End: 2021-12-06 | Stop reason: ALTCHOICE

## 2019-12-28 NOTE — PROGRESS NOTES
Drain removed per Dr. Kenneth Diaz, ok to discharge pt home, he has pain meds at home per Dr. Kenneth Diaz. Pt instructed to stay off of foot for 2-3 weeks and keep elevated all the time, no outings. Pt verbalized understanding. PIV removed.  Pt's wife coming to pick h

## 2019-12-28 NOTE — PLAN OF CARE
S/p left calcaneous ORIF. Arrived to floor from PACU. RA, RAUL. Lovenox. Voiding well. Placed on regular diet, denies nausea. Pain controlled at this time. Bedrest orders. Davol drain to LLE, post mold to LLE and elevated per orders. NWB LLE.  Oriented to ro

## 2019-12-28 NOTE — OPERATIVE REPORT
[facility]    PATIENT'S NAME: LAURA, [address] [facility] PHYSICIAN: Heron Oleary. Ramesh Rowe MD   OPERATING PHYSICIAN: Heron Oleary.  Ramesh Rowe MD   PATIENT ACCOUNT#:   [id number]    LOCATION:  3SW-A [facility]  MEDICAL RECORD #:   [id number]       DATE OF BIRTH:  [birth year] incision on the lateral side of the foot falling just anterior to the Achilles border and then turning 90 degrees to be just above the glabrous border with the plantar skin.   This was raised as a full-thickness flap all the way to the sinus tarsi and K-wir treatment of the fracture fragments, I then placed in the defect that was underneath the superolateral fragment and I replaced the buckled cortex that I had removed temporarily in order to gain access to the fracture, putting it underneath the plate to res

## 2019-12-28 NOTE — PHYSICAL THERAPY NOTE
PHYSICAL THERAPY QUICK EVALUATION - INPATIENT    Room Number: 371/371-A  Evaluation Date: 12/28/2019  Physician Order: PT Eval and Treat    Problem List  Active Problems:    Calcaneus fracture, left  Surgical Findings: Displaced Joint Depression calcaneu STRENGTH ASSESSMENT  Upper extremity ROM and strength are within functional limits     Lower extremity ROM is within functional limits     Lower extremity strength is within functional limits - post operative LLE immobilized         AM-PAC '6-Clicks' INPAT measures completed include [facility]. Based on this evaluation, patient's clinical presentation is stable and overall evaluation complexity is considered low. He completed transfers, ambulation and stair climbing without evidence of LOB or buckling.  He is

## 2019-12-28 NOTE — PROGRESS NOTES
[facility]    Progress Note    Addis Keith Patient Status:  Outpatient in a Bed    1960 MRN [id number]   [facility] 3SW-A Attending Eddie Gilmore MD   Hosp Day # 0 PCP Yovani Barclay MD        Subjective:   Addis Parker <0.01 12/26/2019       Xr Fluoroscopy C-arm Time <1 Hour  (cpt=76000)    Result Date: 12/27/2019  CONCLUSION: Intraoperative images for operative control.    Dictated by: Kallie Spears MD on 12/27/2019 at 16:21     Approved by: Kallie Spears MD on 12/

## 2019-12-28 NOTE — PLAN OF CARE
PT AOX4 this PM. VSS on RA. Post mold in place to LLE, extremity elevated. No complaints of numbness or tingling. PT has warm extremities, could wiggle toes on LLE in beginning of shift. C/o inability to move toes around 3 AM, new tingling.  Anesthesia page

## 2020-01-29 ENCOUNTER — OFFICE VISIT (OUTPATIENT)
Dept: PHYSICAL THERAPY | Age: 60
End: 2020-01-29
Attending: ORTHOPAEDIC SURGERY
Payer: COMMERCIAL

## 2020-01-29 DIAGNOSIS — S92.062A CLOSED DISPLACED INTRA-ARTICULAR FRACTURE OF LEFT CALCANEUS, INITIAL ENCOUNTER: ICD-10-CM

## 2020-01-29 PROCEDURE — 97530 THERAPEUTIC ACTIVITIES: CPT | Performed by: PHYSICAL THERAPIST

## 2020-01-29 PROCEDURE — 97161 PT EVAL LOW COMPLEX 20 MIN: CPT | Performed by: PHYSICAL THERAPIST

## 2020-01-29 NOTE — PROGRESS NOTES
LOWER EXTREMITY EVALUATION:   Referring Physician: Dr. Yovani Cheek  Diagnosis: closed displaced intra- articular fracture of L calcaneus ORIF on 12/27/2019     Date of Service: 1/29/2020     PATIENT SUMMARY   Anel Silva is a 62year old male who present referring Luz Elena William to [facility].     Precautions:  Non Weight bearing, strengthening post tibialis, peroneus brevis, ankle DF, PF, foot intrinsics and toe DF, PF,restore subtalar motion, scar mobilization, HEP modalities  OBJECTIVE:   Observation will demonstrate improved DF AROM to >= 10 degrees to promote proper foot clearance during gait and greater ease descending stairs without compensation  · Pt will have increased ankle strength to 5/5 throughout for improved ankle control with ADLs such as

## 2020-02-03 ENCOUNTER — OFFICE VISIT (OUTPATIENT)
Dept: PHYSICAL THERAPY | Age: 60
End: 2020-02-03
Attending: ORTHOPAEDIC SURGERY
Payer: COMMERCIAL

## 2020-02-03 DIAGNOSIS — S92.062A CLOSED DISPLACED INTRA-ARTICULAR FRACTURE OF LEFT CALCANEUS, INITIAL ENCOUNTER: ICD-10-CM

## 2020-02-03 PROCEDURE — 97110 THERAPEUTIC EXERCISES: CPT | Performed by: PHYSICAL THERAPIST

## 2020-02-03 PROCEDURE — 97140 MANUAL THERAPY 1/> REGIONS: CPT | Performed by: PHYSICAL THERAPIST

## 2020-02-03 NOTE — PROGRESS NOTES
Dx: closed displaced intra- articular fracture of L calcaneus ORIF on 12/27/2019         Authorized # of Visits:  n/a         Next MD visit: 2/10/2020  Fall Risk: standard         Precautions: n/a             Subjective: Pt states he has been doing his exe Treatment Time: 45 min

## 2020-02-05 ENCOUNTER — OFFICE VISIT (OUTPATIENT)
Dept: PHYSICAL THERAPY | Age: 60
End: 2020-02-05
Attending: ORTHOPAEDIC SURGERY
Payer: COMMERCIAL

## 2020-02-05 DIAGNOSIS — S92.062A CLOSED DISPLACED INTRA-ARTICULAR FRACTURE OF LEFT CALCANEUS, INITIAL ENCOUNTER: ICD-10-CM

## 2020-02-05 PROCEDURE — 97110 THERAPEUTIC EXERCISES: CPT | Performed by: PHYSICAL THERAPIST

## 2020-02-05 PROCEDURE — 97140 MANUAL THERAPY 1/> REGIONS: CPT | Performed by: PHYSICAL THERAPIST

## 2020-02-05 NOTE — PROGRESS NOTES
Dx: closed displaced intra- articular fracture of L calcaneus ORIF on 12/27/2019         Authorized # of Visits:  n/a         Next MD visit: 2/10/2020  Fall Risk: standard         Precautions: n/a             Subjective: Pt states he noticed that some scab decrease edema                                                                              Charges:  There ex X 2, man there X 1       Total Timed Treatment: 45 min  Total Treatment Time: 45 min

## 2020-02-11 ENCOUNTER — APPOINTMENT (OUTPATIENT)
Dept: PHYSICAL THERAPY | Age: 60
End: 2020-02-11
Attending: ORTHOPAEDIC SURGERY
Payer: COMMERCIAL

## 2020-02-13 ENCOUNTER — APPOINTMENT (OUTPATIENT)
Dept: PHYSICAL THERAPY | Age: 60
End: 2020-02-13
Attending: ORTHOPAEDIC SURGERY
Payer: COMMERCIAL

## 2020-02-17 ENCOUNTER — OFFICE VISIT (OUTPATIENT)
Dept: PHYSICAL THERAPY | Age: 60
End: 2020-02-17
Attending: ORTHOPAEDIC SURGERY
Payer: COMMERCIAL

## 2020-02-17 DIAGNOSIS — S92.062A CLOSED DISPLACED INTRA-ARTICULAR FRACTURE OF LEFT CALCANEUS, INITIAL ENCOUNTER: ICD-10-CM

## 2020-02-17 PROCEDURE — 97110 THERAPEUTIC EXERCISES: CPT | Performed by: PHYSICAL THERAPIST

## 2020-02-17 NOTE — PROGRESS NOTES
Dx: closed displaced intra- articular fracture of L calcaneus ORIF on 12/27/2019         Authorized # of Visits:  n/a         Next MD visit: 3/23/2020 10:45 am  Fall Risk: standard         Precautions: NWB until 3/2, then begin PWB (begin with wt of leg) a bike X 5 mins       Ankle DF/PF X 20 Ankle DF/PF X 20 Towel scrunches 3 mins       Ankle inver/ ever X 20 Ankle inver/ ever X 20 Towel wipers 3 mins       Ankle circles CW, CCW X 20 Ankle alphabets A - Z X 1 Alternate toe taps in sitting X 30       Balance

## 2020-02-19 ENCOUNTER — OFFICE VISIT (OUTPATIENT)
Dept: PHYSICAL THERAPY | Age: 60
End: 2020-02-19
Attending: ORTHOPAEDIC SURGERY
Payer: COMMERCIAL

## 2020-02-19 PROCEDURE — 97110 THERAPEUTIC EXERCISES: CPT | Performed by: PHYSICAL THERAPIST

## 2020-02-19 PROCEDURE — 97140 MANUAL THERAPY 1/> REGIONS: CPT | Performed by: PHYSICAL THERAPIST

## 2020-02-19 NOTE — PROGRESS NOTES
Dx: closed displaced intra- articular fracture of L calcaneus ORIF on 12/27/2019         Authorized # of Visits:  n/a         Next MD visit: 3/23/2020 10:45 am  Fall Risk: standard         Precautions: NWB until 3/2, then begin PWB (begin with wt of leg) a X 20 Towel scrunches 3 mins Towel scrunches 3 mins      Ankle inver/ ever X 20 Ankle inver/ ever X 20 Towel wipers 3 mins Towel wipers 3 mins      Ankle circles CW, CCW X 20 Ankle alphabets A - Z X 1 Alternate toe taps in sitting X 30 Seated ankle DF X 30

## 2020-02-20 ENCOUNTER — APPOINTMENT (OUTPATIENT)
Dept: PHYSICAL THERAPY | Age: 60
End: 2020-02-20
Attending: ORTHOPAEDIC SURGERY
Payer: COMMERCIAL

## 2020-02-24 ENCOUNTER — OFFICE VISIT (OUTPATIENT)
Dept: PHYSICAL THERAPY | Age: 60
End: 2020-02-24
Attending: ORTHOPAEDIC SURGERY
Payer: COMMERCIAL

## 2020-02-24 DIAGNOSIS — S92.062A CLOSED DISPLACED INTRA-ARTICULAR FRACTURE OF LEFT CALCANEUS, INITIAL ENCOUNTER: ICD-10-CM

## 2020-02-24 PROCEDURE — 97140 MANUAL THERAPY 1/> REGIONS: CPT | Performed by: PHYSICAL THERAPIST

## 2020-02-24 PROCEDURE — 97110 THERAPEUTIC EXERCISES: CPT | Performed by: PHYSICAL THERAPIST

## 2020-02-24 NOTE — PROGRESS NOTES
Dx: closed displaced intra- articular fracture of L calcaneus ORIF on 12/27/2019         Authorized # of Visits:  n/a         Next MD visit: 3/23/2020 10:45 am  Fall Risk: standard         Precautions: NWB until 3/2, then begin PWB (begin with wt of leg) a bike X 5 mins Recumbent bike X 5 mins     Ankle DF/PF X 20 Ankle DF/PF X 20 Towel scrunches 3 mins Towel scrunches 3 mins Instruction for 4 way ankle with RTB for home     Ankle inver/ ever X 20 Ankle inver/ ever X 20 Towel wipers 3 mins Towel wipers 3 min

## 2020-02-27 ENCOUNTER — OFFICE VISIT (OUTPATIENT)
Dept: PHYSICAL THERAPY | Age: 60
End: 2020-02-27
Attending: ORTHOPAEDIC SURGERY
Payer: COMMERCIAL

## 2020-02-27 DIAGNOSIS — S92.062A CLOSED DISPLACED INTRA-ARTICULAR FRACTURE OF LEFT CALCANEUS, INITIAL ENCOUNTER: ICD-10-CM

## 2020-02-27 PROCEDURE — 97110 THERAPEUTIC EXERCISES: CPT | Performed by: PHYSICAL THERAPIST

## 2020-02-27 PROCEDURE — 97140 MANUAL THERAPY 1/> REGIONS: CPT | Performed by: PHYSICAL THERAPIST

## 2020-02-27 NOTE — PROGRESS NOTES
Dx: closed displaced intra- articular fracture of L calcaneus ORIF on 12/27/2019         Authorized # of Visits:  n/a         Next MD visit: 3/23/2020 10:45 am  Fall Risk: standard         Precautions: NWB until 3/2, then begin PWB (begin with wt of leg) a 4/18 Date: 2/19/2020  TX#: 5/18 Date: 2/24/2020  TX#: 6/18 Date: 2/27/2020  TX#: 7/18 Date:  TX#: 8/   THERE EX 30  MINS THERE EX 20  MINS THERE EX 35  MINS THERE EX 35  MINS THERE EX 10  MINS THERE EX 30  MINS    Recumbent bike X 5 mins Recumbent bike X 5

## 2020-03-03 ENCOUNTER — OFFICE VISIT (OUTPATIENT)
Dept: PHYSICAL THERAPY | Age: 60
End: 2020-03-03
Attending: FAMILY MEDICINE
Payer: COMMERCIAL

## 2020-03-03 PROCEDURE — 97116 GAIT TRAINING THERAPY: CPT | Performed by: PHYSICAL THERAPIST

## 2020-03-03 PROCEDURE — 97110 THERAPEUTIC EXERCISES: CPT | Performed by: PHYSICAL THERAPIST

## 2020-03-03 PROCEDURE — 97140 MANUAL THERAPY 1/> REGIONS: CPT | Performed by: PHYSICAL THERAPIST

## 2020-03-03 NOTE — PROGRESS NOTES
Dx: closed displaced intra- articular fracture of L calcaneus ORIF on 12/27/2019         Authorized # of Visits:  n/a         Next MD visit: 3/23/2020 10:45 am  Fall Risk: standard         Precautions: NWB until 3/2, then begin PWB (begin with wt of leg) a bike X 5 mins Recumbent bike X 5 mins Recumbent bike X 5 mins        Instruction for 4 way ankle with RTB for home Standing balance board taps without WB on L F-B, S-S X 30 ea Seated rocker board L taps F-B, S-S, circles CW, CCW X 30        Ankle alphabets

## 2020-03-04 ENCOUNTER — APPOINTMENT (OUTPATIENT)
Dept: PHYSICAL THERAPY | Age: 60
End: 2020-03-04
Attending: FAMILY MEDICINE
Payer: COMMERCIAL

## 2020-03-05 ENCOUNTER — OFFICE VISIT (OUTPATIENT)
Dept: PHYSICAL THERAPY | Age: 60
End: 2020-03-05
Attending: FAMILY MEDICINE
Payer: COMMERCIAL

## 2020-03-05 PROCEDURE — 97110 THERAPEUTIC EXERCISES: CPT | Performed by: PHYSICAL THERAPIST

## 2020-03-05 PROCEDURE — 97116 GAIT TRAINING THERAPY: CPT | Performed by: PHYSICAL THERAPIST

## 2020-03-05 NOTE — PROGRESS NOTES
Dx: closed displaced intra- articular fracture of L calcaneus ORIF on 12/27/2019         Authorized # of Visits:  n/a         Next MD visit: 3/23/2020 10:45 am  Fall Risk: standard         Precautions: NWB until 3/2, then begin PWB (begin with wt of leg) a Recumbent bike X 5 mins Recumbent bike X 5 mins Recumbent bike X 5 mins Recumbent bike X 5 mins level 4 without boot       Instruction for 4 way ankle with RTB for home Standing balance board taps without WB on L F-B, S-S X 30 ea Seated rocker board L t

## 2020-03-10 ENCOUNTER — OFFICE VISIT (OUTPATIENT)
Dept: PHYSICAL THERAPY | Age: 60
End: 2020-03-10
Attending: FAMILY MEDICINE
Payer: COMMERCIAL

## 2020-03-10 PROCEDURE — 97140 MANUAL THERAPY 1/> REGIONS: CPT | Performed by: PHYSICAL THERAPIST

## 2020-03-10 PROCEDURE — 97110 THERAPEUTIC EXERCISES: CPT | Performed by: PHYSICAL THERAPIST

## 2020-03-10 NOTE — PROGRESS NOTES
Dx: closed displaced intra- articular fracture of L calcaneus ORIF on 12/27/2019         Authorized # of Visits:  n/a         Next MD visit: 3/23/2020 10:45 am  Fall Risk: standard         Precautions: NWB until 3/2, then begin PWB (begin with wt of leg) a PT    Plan: Progress towards weaning off crutches.   Date: 2/24/2020  TX#: 6/18 Date: 2/27/2020  TX#: 7/18 Date: 3/3/2020  TX#: 8/18 Date: 3/5/2020  TX#: 9/18 Date: 3/10/2020  TX#: 10/18 Date:  TX#: /18 Date:  TX#: /18 Date:  TX#: /18   THERE EX 10  MINS TH

## 2020-03-11 ENCOUNTER — APPOINTMENT (OUTPATIENT)
Dept: PHYSICAL THERAPY | Age: 60
End: 2020-03-11
Attending: FAMILY MEDICINE
Payer: COMMERCIAL

## 2020-03-12 ENCOUNTER — TELEPHONE (OUTPATIENT)
Dept: PHYSICAL THERAPY | Age: 60
End: 2020-03-12

## 2020-03-16 ENCOUNTER — OFFICE VISIT (OUTPATIENT)
Dept: PHYSICAL THERAPY | Age: 60
End: 2020-03-16
Attending: FAMILY MEDICINE
Payer: COMMERCIAL

## 2020-03-16 PROCEDURE — 97110 THERAPEUTIC EXERCISES: CPT | Performed by: PHYSICAL THERAPIST

## 2020-03-16 PROCEDURE — 97140 MANUAL THERAPY 1/> REGIONS: CPT | Performed by: PHYSICAL THERAPIST

## 2020-03-16 NOTE — PROGRESS NOTES
Dx: closed displaced intra- articular fracture of L calcaneus ORIF on 12/27/2019         Authorized # of Visits:  n/a         Next MD visit: 3/23/2020 10:45 am  Fall Risk: standard         Precautions: NWB until 3/2, then begin PWB (begin with wt of leg) a Date:  TX#: /18   THERE EX 10  MINS THERE EX 30  MINS THERE EX 15  MINS THERE EX 35  MINS THERE EX 45  MINS THERE EX 35  MINS     Recumbent bike X 5 mins Recumbent bike X 5 mins Recumbent bike X 5 mins Recumbent bike X 5 mins level 4 without boot Recumbent

## 2020-03-17 ENCOUNTER — OFFICE VISIT (OUTPATIENT)
Dept: PHYSICAL THERAPY | Age: 60
End: 2020-03-17
Attending: ORTHOPAEDIC SURGERY
Payer: COMMERCIAL

## 2020-03-18 ENCOUNTER — OFFICE VISIT (OUTPATIENT)
Dept: PHYSICAL THERAPY | Age: 60
End: 2020-03-18
Attending: FAMILY MEDICINE
Payer: COMMERCIAL

## 2020-03-18 PROCEDURE — 97110 THERAPEUTIC EXERCISES: CPT | Performed by: PHYSICAL THERAPIST

## 2020-03-18 PROCEDURE — 97116 GAIT TRAINING THERAPY: CPT | Performed by: PHYSICAL THERAPIST

## 2020-03-18 NOTE — PROGRESS NOTES
Dx: closed displaced intra- articular fracture of L calcaneus ORIF on 12/27/2019         Authorized # of Visits:  n/a         Next MD visit: 3/23/2020 10:45 am  Fall Risk: standard         Precautions: NWB until 3/2, then begin PWB (begin with wt of leg) a · Pt will be independent and compliant with comprehensive HEP to maintain progress achieved in PT    FOTO: 50/100 (from 40/100 at initial eval)    Plan: Continue skilled Physical Therapy 2 x/week or a total of 12 visits over a 90 day period.  Treatment wi support X 20 MAN THERE 10 MINS GAIT TRAINING 10 MINS        Ankle taps in sitting X 30 STM with end range stretch in all directions Walking with boot but without crutches 500' X 1        MAN THERE 10 MINS  Stair negotiation with 1 rail in reciprocal patter

## 2020-03-23 ENCOUNTER — OFFICE VISIT (OUTPATIENT)
Dept: PHYSICAL THERAPY | Age: 60
End: 2020-03-23
Attending: FAMILY MEDICINE
Payer: COMMERCIAL

## 2020-03-23 PROCEDURE — 97140 MANUAL THERAPY 1/> REGIONS: CPT | Performed by: PHYSICAL THERAPIST

## 2020-03-23 PROCEDURE — 97110 THERAPEUTIC EXERCISES: CPT | Performed by: PHYSICAL THERAPIST

## 2020-03-23 NOTE — PROGRESS NOTES
Dx: closed displaced intra- articular fracture of L calcaneus ORIF on 12/27/2019         Authorized # of Visits:  n/a         Next MD visit: 3/23/2020 10:45 am  Fall Risk: standard         Precautions: NWB until 3/2, then begin PWB (begin with wt of leg) a THERE EX 35  MINS       Recumbent bike without boot level 1.3 X 5 mins Recumbent bike without boot level 1.3 X 5 mins Recumbent bike without boot level 1.4 X 5 mins Recumbent bike without boot level 1.4 X 5 mins       Seated gastrocs and soleus stretch wit

## 2020-03-25 ENCOUNTER — APPOINTMENT (OUTPATIENT)
Dept: PHYSICAL THERAPY | Age: 60
End: 2020-03-25
Attending: FAMILY MEDICINE
Payer: COMMERCIAL

## 2020-03-30 ENCOUNTER — OFFICE VISIT (OUTPATIENT)
Dept: PHYSICAL THERAPY | Age: 60
End: 2020-03-30
Attending: FAMILY MEDICINE
Payer: COMMERCIAL

## 2020-03-30 PROCEDURE — 97014 ELECTRIC STIMULATION THERAPY: CPT | Performed by: PHYSICAL THERAPIST

## 2020-03-30 PROCEDURE — 97110 THERAPEUTIC EXERCISES: CPT | Performed by: PHYSICAL THERAPIST

## 2020-03-30 NOTE — PROGRESS NOTES
Dx: closed displaced intra- articular fracture of L calcaneus ORIF on 12/27/2019         Authorized # of Visits:  n/a         Next MD visit: 3/23/2020 10:45 am  Fall Risk: standard         Precautions: NWB until 3/2, then begin PWB (begin with wt of leg) a THERE EX 35  MINS      Recumbent bike without boot level 1.3 X 5 mins Recumbent bike without boot level 1.3 X 5 mins Recumbent bike without boot level 1.4 X 5 mins Recumbent bike without boot level 1.4 X 5 mins Recumbent bike X 5 min level 4      Seated ga

## 2020-04-01 ENCOUNTER — APPOINTMENT (OUTPATIENT)
Dept: PHYSICAL THERAPY | Age: 60
End: 2020-04-01
Attending: FAMILY MEDICINE
Payer: COMMERCIAL

## 2020-04-02 ENCOUNTER — TELEPHONE (OUTPATIENT)
Dept: PHYSICAL THERAPY | Age: 60
End: 2020-04-02

## 2020-04-06 ENCOUNTER — APPOINTMENT (OUTPATIENT)
Dept: PHYSICAL THERAPY | Age: 60
End: 2020-04-06
Payer: COMMERCIAL

## 2020-04-06 ENCOUNTER — OFFICE VISIT (OUTPATIENT)
Dept: PHYSICAL THERAPY | Age: 60
End: 2020-04-06
Attending: FAMILY MEDICINE
Payer: COMMERCIAL

## 2020-04-06 PROCEDURE — 97110 THERAPEUTIC EXERCISES: CPT

## 2020-04-06 NOTE — PROGRESS NOTES
Dx: closed displaced intra- articular fracture of L calcaneus ORIF on 12/27/2019         Authorized # of Visits:  n/a         Next MD visit: 3/23/2020 10:45 am  Fall Risk: standard         Precautions: NWB until 3/2, then begin PWB (begin with wt of leg) a Date:  TX#: /18 Date:  TX#: /18   THERE EX 45  MINS THERE EX 35  MINS THERE EX 35  MINS THERE EX 35  MINS THERE EX 35  MINS THERE EX 45 MIN     Recumbent bike without boot level 1.3 X 5 mins Recumbent bike without boot level 1.3 X 5 mins Recumbent bike wit STM IFC  With CP to L ankle X 15 mins PROM L ankle DF, PF, INV, EV                                                             Charges:  There ex 3  Total Timed Treatment: 45 min  Total Treatment Time: 48 min

## 2020-04-07 ENCOUNTER — APPOINTMENT (OUTPATIENT)
Dept: PHYSICAL THERAPY | Age: 60
End: 2020-04-07
Attending: FAMILY MEDICINE
Payer: COMMERCIAL

## 2020-04-09 ENCOUNTER — APPOINTMENT (OUTPATIENT)
Dept: PHYSICAL THERAPY | Age: 60
End: 2020-04-09
Attending: FAMILY MEDICINE
Payer: COMMERCIAL

## 2020-04-13 ENCOUNTER — OFFICE VISIT (OUTPATIENT)
Dept: PHYSICAL THERAPY | Age: 60
End: 2020-04-13
Attending: ORTHOPAEDIC SURGERY
Payer: COMMERCIAL

## 2020-04-13 ENCOUNTER — APPOINTMENT (OUTPATIENT)
Dept: PHYSICAL THERAPY | Age: 60
End: 2020-04-13
Attending: FAMILY MEDICINE
Payer: COMMERCIAL

## 2020-04-13 PROCEDURE — 97140 MANUAL THERAPY 1/> REGIONS: CPT

## 2020-04-13 PROCEDURE — 97110 THERAPEUTIC EXERCISES: CPT

## 2020-04-13 NOTE — PROGRESS NOTES
Dx: closed displaced intra- articular fracture of L calcaneus ORIF on 12/27/2019         Authorized # of Visits:  n/a         Next MD visit: 6/23/2020   Fall Risk: standard         Precautions: NWB until 3/2, then begin PWB (begin with wt of leg) and do pr continue with weight bearing exercises for improved endurance.     Date: 3/10/2020  TX#: 10/18 Date: 3/16/2020  TX#: 11/18 Date: 3/18/2020  TX#: 12/18 Date: 3/23/2020  TX#: 13/18 Date: 3/30/2020  TX#: 14/18 Date: 4/6/2020  TX#: 15/18 Date: 4/13/2020  TX#: 1 stretch in all directions Walking with boot but without crutches 500' X 1 Heel raises X 20 Heel raises X 20 YB monster walk // bars x 4 lengths YB monster walk 30 ft x 2    MAN THERE 10 MINS  Stair negotiation with 1 rail in reciprocal pattern X 1 flight T

## 2020-04-15 ENCOUNTER — APPOINTMENT (OUTPATIENT)
Dept: PHYSICAL THERAPY | Age: 60
End: 2020-04-15
Attending: FAMILY MEDICINE
Payer: COMMERCIAL

## 2020-04-20 ENCOUNTER — APPOINTMENT (OUTPATIENT)
Dept: PHYSICAL THERAPY | Age: 60
End: 2020-04-20
Attending: FAMILY MEDICINE
Payer: COMMERCIAL

## 2020-04-20 ENCOUNTER — OFFICE VISIT (OUTPATIENT)
Dept: PHYSICAL THERAPY | Age: 60
End: 2020-04-20
Attending: ORTHOPAEDIC SURGERY
Payer: COMMERCIAL

## 2020-04-20 PROCEDURE — 97110 THERAPEUTIC EXERCISES: CPT

## 2020-04-20 PROCEDURE — 97140 MANUAL THERAPY 1/> REGIONS: CPT

## 2020-04-20 NOTE — PROGRESS NOTES
Dx: closed displaced intra- articular fracture of L calcaneus ORIF on 12/27/2019         Authorized # of Visits:  n/a         Next MD visit: 6/23/2020   Fall Risk: standard         Precautions: NWB until 3/2, then begin PWB (begin with wt of leg) and do pr MINS THERE EX 45 MIN THEREX 45 min THEREX 45 min   Recumbent bike without boot level 1.4 X 5 mins Recumbent bike X 5 min level 4 Recumbent bike x 5 min L = 4 Recumbent bike x 5 min L=4 Recumbent bike x 6 min  L=4   Slant board stretch 30 sec X 3 gastrocs a Charges:  There ex 3 (45 min) MT1 (10 min) Total Timed Treatment: 55 min  Total Treatment Time: 55 min

## 2020-04-22 ENCOUNTER — APPOINTMENT (OUTPATIENT)
Dept: PHYSICAL THERAPY | Age: 60
End: 2020-04-22
Payer: COMMERCIAL

## 2020-04-27 ENCOUNTER — OFFICE VISIT (OUTPATIENT)
Dept: PHYSICAL THERAPY | Age: 60
End: 2020-04-27
Attending: ORTHOPAEDIC SURGERY
Payer: COMMERCIAL

## 2020-04-27 ENCOUNTER — APPOINTMENT (OUTPATIENT)
Dept: PHYSICAL THERAPY | Age: 60
End: 2020-04-27
Attending: FAMILY MEDICINE
Payer: COMMERCIAL

## 2020-04-27 PROCEDURE — 97110 THERAPEUTIC EXERCISES: CPT

## 2020-04-27 PROCEDURE — 97140 MANUAL THERAPY 1/> REGIONS: CPT

## 2020-04-27 NOTE — PROGRESS NOTES
Dx: closed displaced intra- articular fracture of L calcaneus ORIF on 12/27/2019         Authorized # of Visits:  n/a         Next MD visit: 6/23/2020   Fall Risk: standard         Precautions: NWB until 3/2, then begin PWB (begin with wt of leg) and do pr Recumbent bike x 6 min  L=4 tecumbent bike x 6 min L=4   Slant board stretch 30 sec X 3 gastrocs and soleus  Slant board stretch 30 sec X 3 gastrocs and soleus  Slant board stretch 30 sec x 3 gastroc and soleus Slant board stretch 30 sec x 3 gastroc and so MT  STM L ankle and calf x 10 min (retrograde) MT  STM L ankle and calf x 10 min (retrograde) MT  STM L ankle and calf x 10 min (retrograde)                                         Charges:  There ex 2 (30 min) MT1 (10 min) Total Timed Treatment: 40 min

## 2020-04-29 ENCOUNTER — APPOINTMENT (OUTPATIENT)
Dept: PHYSICAL THERAPY | Age: 60
End: 2020-04-29
Attending: FAMILY MEDICINE
Payer: COMMERCIAL

## 2020-05-04 ENCOUNTER — OFFICE VISIT (OUTPATIENT)
Dept: PHYSICAL THERAPY | Age: 60
End: 2020-05-04
Attending: ORTHOPAEDIC SURGERY
Payer: COMMERCIAL

## 2020-05-04 PROCEDURE — 97110 THERAPEUTIC EXERCISES: CPT

## 2020-05-04 PROCEDURE — 97140 MANUAL THERAPY 1/> REGIONS: CPT

## 2020-05-04 NOTE — PROGRESS NOTES
Dx: closed displaced intra- articular fracture of L calcaneus ORIF on 12/27/2019         Authorized # of Visits:  n/a         Next MD visit: 6/23/2020   Fall Risk: standard         Precautions: NWB until 3/2, then begin PWB (begin with wt of leg) and do pr THEREX 45 min THEREX 45 min   Recumbent bike x 5 min L=4 Recumbent bike x 6 min  L=4 tecumbent bike x 6 min L=4 Recumbent bike x 6 min L=5   Slant board stretch 30 sec x 3 gastroc and soleus Slant board stretch 30 sec x 3 gastroc and soleus Slant board str

## 2020-05-05 ENCOUNTER — APPOINTMENT (OUTPATIENT)
Dept: PHYSICAL THERAPY | Age: 60
End: 2020-05-05
Payer: COMMERCIAL

## 2020-05-11 ENCOUNTER — OFFICE VISIT (OUTPATIENT)
Dept: PHYSICAL THERAPY | Age: 60
End: 2020-05-11
Attending: FAMILY MEDICINE
Payer: COMMERCIAL

## 2020-05-11 PROCEDURE — 97112 NEUROMUSCULAR REEDUCATION: CPT

## 2020-05-11 PROCEDURE — 97110 THERAPEUTIC EXERCISES: CPT

## 2020-05-11 PROCEDURE — 97140 MANUAL THERAPY 1/> REGIONS: CPT

## 2020-05-11 NOTE — PROGRESS NOTES
Dx: closed displaced intra- articular fracture of L calcaneus ORIF on 12/27/2019         Authorized # of Visits:  n/a         Next MD visit: 6/23/2020   Fall Risk: standard         Precautions: NWB until 3/2, then begin PWB (begin with wt of leg) and do pr Recumbent bike x 6 min L=5 Recumbent bike x 6 min L=5   Slant board stretch 30 sec x 3 gastroc and soleus Slant board stretch 30 sec x 3 gastroc and soleus Slant board stretach 30 sec x 3 gastroc and soleus Slant board stretch 30 sec x 3 gastroc and soleus Charges:  There ex 1 (22 min) NR1 (13 min)  (MT1 (10 min) Total Timed Treatment: 45 min  Total Treatment Time: 45 min

## 2020-05-18 ENCOUNTER — OFFICE VISIT (OUTPATIENT)
Dept: PHYSICAL THERAPY | Age: 60
End: 2020-05-18
Attending: FAMILY MEDICINE
Payer: COMMERCIAL

## 2020-05-18 PROCEDURE — 97112 NEUROMUSCULAR REEDUCATION: CPT

## 2020-05-18 PROCEDURE — 97110 THERAPEUTIC EXERCISES: CPT

## 2020-05-18 NOTE — PROGRESS NOTES
Dx: closed displaced intra- articular fracture of L calcaneus ORIF on 12/27/2019         Authorized # of Visits:  n/a         Next MD visit: 6/23/2020   Fall Risk: standard         Precautions: NWB until 3/2, then begin PWB (begin with wt of leg) and do pr THEREX 45 min THEREX 45 min THEREX 45 min THEREX 45 min   Recumbent bike x 5 min L=4 Recumbent bike x 6 min  L=4 tecumbent bike x 6 min L=4 Recumbent bike x 6 min L=5 Recumbent bike x 6 min L=5 Recumbent bike x 6 min L=6   Slant board stretch 30 sec x 3 ga DF, PF, INV, EV PROM L ankle DF, PF, INV, EV   PROM L ankle DF, PF, INV, EV   MT  STM L ankle and calf x 10 min (retrograde) MT  STM L ankle and calf x 10 min (retrograde) MT  STM L ankle and calf x 10 min (retrograde) MT  STM L ankle, cross friction scar

## 2020-05-27 ENCOUNTER — OFFICE VISIT (OUTPATIENT)
Dept: PHYSICAL THERAPY | Age: 60
End: 2020-05-27
Attending: ORTHOPAEDIC SURGERY
Payer: COMMERCIAL

## 2020-05-27 PROCEDURE — 97112 NEUROMUSCULAR REEDUCATION: CPT

## 2020-05-27 PROCEDURE — 97110 THERAPEUTIC EXERCISES: CPT

## 2020-05-27 NOTE — PROGRESS NOTES
Dx: closed displaced intra- articular fracture of L calcaneus ORIF on 12/27/2019         Authorized # of Visits:  n/a         Next MD visit: 6/23/2020   Fall Risk: standard         Precautions: NWB until 3/2, then begin PWB (begin with wt of leg) and do pr THEREX 45 min THEREX 45 min   Recumbent bike x 5 min L=4 Recumbent bike x 6 min  L=4 tecumbent bike x 6 min L=4 Recumbent bike x 6 min L=5 Recumbent bike x 6 min L=5 Recumbent bike x 6 min L=6 Recumbent bike x 6 min L=6   Slant board stretch 30 sec x 3 gas ball toss (green) rebounder 2 x 10 Single leg balance LLE   Ball toss (green) rebounder 2 x 10 Single leg balance LLE ball toss rebounder (green) 3 x 10      - - -  -      PROM L ankle DF, PF, INV, EV PROM L ankle DF, PF, INV, EV PROM L ankle DF, PF, INV,

## 2020-06-01 ENCOUNTER — OFFICE VISIT (OUTPATIENT)
Dept: PHYSICAL THERAPY | Age: 60
End: 2020-06-01
Attending: ORTHOPAEDIC SURGERY
Payer: COMMERCIAL

## 2020-06-01 PROCEDURE — 97112 NEUROMUSCULAR REEDUCATION: CPT

## 2020-06-01 PROCEDURE — 97110 THERAPEUTIC EXERCISES: CPT

## 2020-06-01 NOTE — PROGRESS NOTES
Dx: closed displaced intra- articular fracture of L calcaneus ORIF on 12/27/2019         Authorized # of Visits:  n/a         Next MD visit: 6/23/2020   Fall Risk: standard         Precautions: NWB until 3/2, then begin PWB (begin with wt of leg) and do pr 6/1/2020)      Plan: Discontinue Physical Therapy at this time. Patient was encouraged to continue HEP.       Patient/Family/Caregiver was advised of these findings, precautions, and treatment options and has agreed to actively participate in planning and f AP x 25  Balance board ML x 25    Alternate forward lunges x 20   Balance board AP x 25  Balance board ML x 25    Alternate forward lunges x 20  Lateral lunges x 20  TRX squats 2 x 15   Balance AP x 25  Balance board ML x 25  Alternate forward lunges x 20

## 2020-11-17 ENCOUNTER — TELEPHONE (OUTPATIENT)
Dept: FAMILY MEDICINE CLINIC | Facility: CLINIC | Age: 60
End: 2020-11-17

## 2020-11-17 DIAGNOSIS — Z85.46 HISTORY OF PROSTATE CANCER: Primary | ICD-10-CM

## 2020-11-17 DIAGNOSIS — Z00.00 LABORATORY EXAMINATION ORDERED AS PART OF A ROUTINE GENERAL MEDICAL EXAMINATION: ICD-10-CM

## 2020-11-17 NOTE — TELEPHONE ENCOUNTER
Pt called and was informed that lab orders were placed. Central scheduling number provided. Questions answered. Pt verbalized understanding.

## 2020-11-20 ENCOUNTER — TELEPHONE (OUTPATIENT)
Dept: FAMILY MEDICINE CLINIC | Facility: CLINIC | Age: 60
End: 2020-11-20

## 2020-11-20 ENCOUNTER — LAB ENCOUNTER (OUTPATIENT)
Dept: LAB | Age: 60
End: 2020-11-20
Attending: FAMILY MEDICINE
Payer: COMMERCIAL

## 2020-11-20 DIAGNOSIS — Z85.46 HISTORY OF PROSTATE CANCER: ICD-10-CM

## 2020-11-20 DIAGNOSIS — Z00.00 LABORATORY EXAMINATION ORDERED AS PART OF A ROUTINE GENERAL MEDICAL EXAMINATION: ICD-10-CM

## 2020-11-20 PROCEDURE — 84153 ASSAY OF PSA TOTAL: CPT | Performed by: FAMILY MEDICINE

## 2020-11-20 PROCEDURE — 36415 COLL VENOUS BLD VENIPUNCTURE: CPT | Performed by: FAMILY MEDICINE

## 2020-11-20 PROCEDURE — 80061 LIPID PANEL: CPT | Performed by: FAMILY MEDICINE

## 2020-11-20 PROCEDURE — 80050 GENERAL HEALTH PANEL: CPT | Performed by: FAMILY MEDICINE

## 2020-11-20 PROCEDURE — 82306 VITAMIN D 25 HYDROXY: CPT | Performed by: FAMILY MEDICINE

## 2020-11-20 NOTE — TELEPHONE ENCOUNTER
FYI: Pt has a scheduled annual appt with you on 11/23 at 0830. Informed pt you will go over lab results with pt then. cherelle counts today.

## 2020-11-20 NOTE — TELEPHONE ENCOUNTER
----- Message from Terence Richter MD sent at 11/20/2020  2:05 PM CST -----  I've never seen this patient, not sure why this came to me.

## 2020-11-23 ENCOUNTER — OFFICE VISIT (OUTPATIENT)
Dept: FAMILY MEDICINE CLINIC | Facility: CLINIC | Age: 60
End: 2020-11-23
Payer: COMMERCIAL

## 2020-11-23 VITALS
OXYGEN SATURATION: 96 % | SYSTOLIC BLOOD PRESSURE: 122 MMHG | HEART RATE: 67 BPM | BODY MASS INDEX: 29.81 KG/M2 | WEIGHT: 201.25 LBS | DIASTOLIC BLOOD PRESSURE: 78 MMHG | RESPIRATION RATE: 16 BRPM | HEIGHT: 69 IN | TEMPERATURE: 97 F

## 2020-11-23 DIAGNOSIS — R79.89 ELEVATED SERUM CREATININE: ICD-10-CM

## 2020-11-23 DIAGNOSIS — Z23 NEED FOR VACCINATION: ICD-10-CM

## 2020-11-23 DIAGNOSIS — Z00.00 WELLNESS EXAMINATION: Primary | ICD-10-CM

## 2020-11-23 PROCEDURE — 90750 HZV VACC RECOMBINANT IM: CPT | Performed by: FAMILY MEDICINE

## 2020-11-23 PROCEDURE — 90686 IIV4 VACC NO PRSV 0.5 ML IM: CPT | Performed by: FAMILY MEDICINE

## 2020-11-23 PROCEDURE — 99396 PREV VISIT EST AGE 40-64: CPT | Performed by: FAMILY MEDICINE

## 2020-11-23 PROCEDURE — 3074F SYST BP LT 130 MM HG: CPT | Performed by: FAMILY MEDICINE

## 2020-11-23 PROCEDURE — 3078F DIAST BP <80 MM HG: CPT | Performed by: FAMILY MEDICINE

## 2020-11-23 PROCEDURE — 3008F BODY MASS INDEX DOCD: CPT | Performed by: FAMILY MEDICINE

## 2020-11-23 PROCEDURE — 90471 IMMUNIZATION ADMIN: CPT | Performed by: FAMILY MEDICINE

## 2020-11-23 PROCEDURE — 90472 IMMUNIZATION ADMIN EACH ADD: CPT | Performed by: FAMILY MEDICINE

## 2020-11-23 PROCEDURE — 99072 ADDL SUPL MATRL&STAF TM PHE: CPT | Performed by: FAMILY MEDICINE

## 2020-11-23 NOTE — PROGRESS NOTES
HPI:   Addis Parker is a 56year old male who presents for an Annual Health Visit. No acute complaints or concerns. Screening: Denies any tobacco, drug or alcohol use. Semi retired. , monogamous.  Has seen dentist and optometrist this ye Paternal Aunt         completed suicide   • Other (Other) Paternal Aunt         Alzheimers   • Psychiatric Paternal Grandmother         hallucinations      SOCIAL HISTORY   Social History    Tobacco Use      Smoking status: Never Smoker      Smokeless toba deferred  Back: negative  Extremities: extremities normal, atraumatic, no cyanosis or edema  Pulses: 2+ and symmetric  Skin: Skin color, texture, turgor normal. No rashes or lesions  Lymph Nodes: No cervical or supraclavicular LAD  Neurologic: Grossly norm normal, follow the advice of your healthcare provider   Colorectal cancer All men at average risk in this age group Multiple tests are available and are used at different times.  Possible tests include:  · Flexible sigmoidoscopy every 5 years, or  · Colonos (possibly up to age [de-identified]) may vary across major organizations    Yearly lung cancer screening with a low-dose CT scan (LDCT); talk with your healthcare provider   Obesity All men in this age group At yearly routine exams   BMI (body mass index) All men in th your case. Then a booster every 5 years if you are still at risk. Talk with your healthcare provider.    Meningococcal B (MenB) Men at increased risk for infection 2 or 3 doses, depending on the vaccine and your case; talk with your healthcare provider   Pn greater  · A diagnosis of diabetes and LDL-C level of greater than 70mg/dL At routine exams, or more often as directed by your healthcare provider.  Statin dosages may vary based on your overall health, risk factors, and other health conditions such as diab

## 2020-11-23 NOTE — PATIENT INSTRUCTIONS
Prevention Guidelines, Men Ages 48 to 59  Screening tests and vaccines are an important part of managing your health. A screening test is done to find diseases in people who don't have any symptoms.  The goal is to find a disease early so lifestyle change Depression All men in this age group At routine exams   Type 2 diabetes or prediabetes All men beginning at age 39 and men without symptoms at any age who are overweight or obese and have 1 or more other risk factors for diabetes At least every 3 years ( age group who have no record of this infection or vaccine 2 doses; second dose should be given at least 4 weeks after the first dose   Hepatitis A Men at increased risk for infection 2 or 3 doses (depending on the vaccine) given at least 6 months apart; ta given in a series of 2 doses.  The 2nd dose is given 2 to 6 months after the first. This is given even if you've had shingles before or had a previous Zoster live vaccine (ZVL; Zostavax)  · Zoster live vaccine (ZVL; Zostavax) may be given to healthy adults

## 2021-02-24 ENCOUNTER — NURSE ONLY (OUTPATIENT)
Dept: FAMILY MEDICINE CLINIC | Facility: CLINIC | Age: 61
End: 2021-02-24
Payer: COMMERCIAL

## 2021-02-24 DIAGNOSIS — Z23 NEED FOR SHINGLES VACCINE: Primary | ICD-10-CM

## 2021-02-24 PROCEDURE — 90750 HZV VACC RECOMBINANT IM: CPT | Performed by: NURSE PRACTITIONER

## 2021-02-24 PROCEDURE — 90471 IMMUNIZATION ADMIN: CPT | Performed by: NURSE PRACTITIONER

## 2021-12-01 ENCOUNTER — TELEPHONE (OUTPATIENT)
Dept: FAMILY MEDICINE CLINIC | Facility: CLINIC | Age: 61
End: 2021-12-01

## 2021-12-01 DIAGNOSIS — Z13.21 SCREENING FOR ENDOCRINE, NUTRITIONAL, METABOLIC AND IMMUNITY DISORDER: ICD-10-CM

## 2021-12-01 DIAGNOSIS — Z13.228 SCREENING FOR ENDOCRINE, NUTRITIONAL, METABOLIC AND IMMUNITY DISORDER: ICD-10-CM

## 2021-12-01 DIAGNOSIS — Z13.29 SCREENING FOR ENDOCRINE, NUTRITIONAL, METABOLIC AND IMMUNITY DISORDER: ICD-10-CM

## 2021-12-01 DIAGNOSIS — Z85.46 HISTORY OF PROSTATE CANCER: Primary | ICD-10-CM

## 2021-12-01 DIAGNOSIS — Z13.0 SCREENING FOR ENDOCRINE, NUTRITIONAL, METABOLIC AND IMMUNITY DISORDER: ICD-10-CM

## 2021-12-01 NOTE — TELEPHONE ENCOUNTER
Lab orders placed per office protocol. Called pt and was informed lab orders have been placed. Pt advised to fast 10-12 hours prior to lab draw. Questions answered and pt verbalized understanding.

## 2021-12-01 NOTE — TELEPHONE ENCOUNTER
Patient has px scheduled for Monday is requesting labs as needed patient is requesting cholesterol to be tested

## 2021-12-03 ENCOUNTER — LAB ENCOUNTER (OUTPATIENT)
Dept: LAB | Age: 61
End: 2021-12-03
Attending: FAMILY MEDICINE
Payer: COMMERCIAL

## 2021-12-03 DIAGNOSIS — Z85.46 HISTORY OF PROSTATE CANCER: ICD-10-CM

## 2021-12-03 DIAGNOSIS — Z13.228 SCREENING FOR ENDOCRINE, NUTRITIONAL, METABOLIC AND IMMUNITY DISORDER: ICD-10-CM

## 2021-12-03 DIAGNOSIS — Z13.29 SCREENING FOR ENDOCRINE, NUTRITIONAL, METABOLIC AND IMMUNITY DISORDER: ICD-10-CM

## 2021-12-03 DIAGNOSIS — Z13.21 SCREENING FOR ENDOCRINE, NUTRITIONAL, METABOLIC AND IMMUNITY DISORDER: ICD-10-CM

## 2021-12-03 DIAGNOSIS — Z13.0 SCREENING FOR ENDOCRINE, NUTRITIONAL, METABOLIC AND IMMUNITY DISORDER: ICD-10-CM

## 2021-12-03 PROCEDURE — 85025 COMPLETE CBC W/AUTO DIFF WBC: CPT

## 2021-12-03 PROCEDURE — 80061 LIPID PANEL: CPT

## 2021-12-03 PROCEDURE — 80053 COMPREHEN METABOLIC PANEL: CPT

## 2021-12-03 PROCEDURE — 82306 VITAMIN D 25 HYDROXY: CPT

## 2021-12-03 PROCEDURE — 36415 COLL VENOUS BLD VENIPUNCTURE: CPT

## 2021-12-03 PROCEDURE — 84153 ASSAY OF PSA TOTAL: CPT

## 2021-12-03 PROCEDURE — 84443 ASSAY THYROID STIM HORMONE: CPT

## 2021-12-06 ENCOUNTER — OFFICE VISIT (OUTPATIENT)
Dept: FAMILY MEDICINE CLINIC | Facility: CLINIC | Age: 61
End: 2021-12-06
Payer: COMMERCIAL

## 2021-12-06 ENCOUNTER — TELEPHONE (OUTPATIENT)
Dept: FAMILY MEDICINE CLINIC | Facility: CLINIC | Age: 61
End: 2021-12-06

## 2021-12-06 VITALS
BODY MASS INDEX: 30.51 KG/M2 | HEART RATE: 85 BPM | HEIGHT: 69 IN | SYSTOLIC BLOOD PRESSURE: 128 MMHG | WEIGHT: 206 LBS | DIASTOLIC BLOOD PRESSURE: 76 MMHG | RESPIRATION RATE: 16 BRPM | OXYGEN SATURATION: 97 %

## 2021-12-06 DIAGNOSIS — K21.9 GASTROESOPHAGEAL REFLUX DISEASE, UNSPECIFIED WHETHER ESOPHAGITIS PRESENT: ICD-10-CM

## 2021-12-06 DIAGNOSIS — Z00.00 WELLNESS EXAMINATION: Primary | ICD-10-CM

## 2021-12-06 DIAGNOSIS — R25.1 TREMOR: ICD-10-CM

## 2021-12-06 DIAGNOSIS — L98.9 SCALP LESION: ICD-10-CM

## 2021-12-06 DIAGNOSIS — Z23 NEED FOR VACCINATION: ICD-10-CM

## 2021-12-06 DIAGNOSIS — E78.2 MIXED HYPERLIPIDEMIA: ICD-10-CM

## 2021-12-06 DIAGNOSIS — Z12.11 SCREENING FOR COLON CANCER: ICD-10-CM

## 2021-12-06 PROCEDURE — 99396 PREV VISIT EST AGE 40-64: CPT | Performed by: FAMILY MEDICINE

## 2021-12-06 PROCEDURE — 99214 OFFICE O/P EST MOD 30 MIN: CPT | Performed by: FAMILY MEDICINE

## 2021-12-06 PROCEDURE — 3078F DIAST BP <80 MM HG: CPT | Performed by: FAMILY MEDICINE

## 2021-12-06 PROCEDURE — 3008F BODY MASS INDEX DOCD: CPT | Performed by: FAMILY MEDICINE

## 2021-12-06 PROCEDURE — 90686 IIV4 VACC NO PRSV 0.5 ML IM: CPT | Performed by: FAMILY MEDICINE

## 2021-12-06 PROCEDURE — 3074F SYST BP LT 130 MM HG: CPT | Performed by: FAMILY MEDICINE

## 2021-12-06 PROCEDURE — 90471 IMMUNIZATION ADMIN: CPT | Performed by: FAMILY MEDICINE

## 2021-12-06 RX ORDER — MULTIVIT-MIN/IRON/FOLIC ACID/K 18-600-40
1 CAPSULE ORAL DAILY
COMMUNITY

## 2021-12-06 RX ORDER — CLOBETASOL PROPIONATE 0.5 MG/G
1 AEROSOL, FOAM TOPICAL 2 TIMES DAILY
Qty: 100 G | Refills: 0 | Status: SHIPPED | OUTPATIENT
Start: 2021-12-06 | End: 2021-12-20

## 2021-12-06 RX ORDER — PROPRANOLOL HYDROCHLORIDE 10 MG/1
10 TABLET ORAL DAILY PRN
Qty: 30 TABLET | Refills: 0 | Status: SHIPPED | OUTPATIENT
Start: 2021-12-06 | End: 2022-01-18

## 2021-12-06 NOTE — PATIENT INSTRUCTIONS
Prevention Guidelines, Men Ages 48 to 59  Screening tests and vaccines are an important part of managing your health. A screening test is done to find diseases in people who don't have any symptoms.  The goal is to find a disease early so lifestyle change Depression All men in this age group At routine exams   Type 2 diabetes or prediabetes All men beginning at age 39 and men without symptoms at any age who are overweight or obese and have 1 or more other risk factors for diabetes At least every 3 years ( age group who have no record of this infection or vaccine 2 doses; second dose should be given at least 4 weeks after the first dose   Hepatitis A Men at increased risk for infection 2 or 3 doses (depending on the vaccine) given at least 6 months apart; ta had a previous zoster live vaccine   Counseling Who needs it How often   Diet and exercise Men who are overweight or obese When diagnosed, and then at routine exams   Sexually transmitted infection prevention Men at increased risk for infection At routine risk. One of the first things to know is that having high cholesterol can start early in life and continue throughout your lifetime. It can increase your risk of developing these conditions over time.  Talk with your healthcare provider about how to get sta tobacco products can reduce this inflammation and reduce the risk for heart disease and stroke. · Diabetes. Do you have diabetes? Is your blood sugar level well-controlled?  If you have diabetes, you may be able to lower your risk of heart disease or a str lipid disorders  Your provider will want to help you understand your risk and options for treatment. If your healthcare provider isn't sure about your risk and whether to start medicine, you and your provider may decide that you need more testing.  This mi                           LDL cholesterol:                          Total cholesterol:                          Triglyceride:                           Step 3: Discuss the results with your healthcare provider  If your cholesterol level is higher than norm meals  · Eating less salt (sodium). This is especially important if you also have high blood pressure.   · Eating more fresh vegetables and fruits  · Eating lean proteins such as fish, poultry, beans, and peas  · Eating less red meat and processed meats  · good. You can quit, too! Think about some of the reasons below to quit smoking. Do any of them make you think twice about your smoking habit?    Stop smoking because it:  · Keeps your cholesterol high, even if you make all the other changes you’re supposed depends on your risk factors. Statins can help you stay healthy. They can also help prevent a heart attack or stroke.  You may need to take a statin if you are in one of the intermediate, high, or very-high risk groups or if you have a multiple risk (enha

## 2021-12-06 NOTE — PROGRESS NOTES
HPI:   Kermit Pineda is a 59year old male who presents for an Annual Health Visit. Tremors - ongoing for the past 1 year, localized to R hand. 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 No symptoms at rest. Denies any gait issues.  Denies anything else  No b Relation Age of Onset   • Hypertension Father    • Lipids Father    • Anxiety Father    • Depression Father    • Psychiatric Father         \"slight to moderate dementia\"   • Cancer Father         prostate   • Heart Disorder Father         Aura Jeffrey supple, symmetrical, trachea midline and thyroid not enlarged, symmetric, no tenderness/mass/nodules  Heart: S1, S2 normal, no murmur, click, rub or gallop, regular rate and rhythm  Lungs: clear to auscultation bilaterally  Chest wall: no tenderness  Abdom External Foam; Apply 1 Application topically 2 (two) times daily for 14 days.     Need for vaccination  -     FLULAVAL INFLUENZA VACCINE QUAD PRESERVATIVE FREE 0.5 ML        Patient Instructions       Prevention Guidelines, Men Ages 48 to 59  Screening test tests are best for you. Some people should be screened using a different schedule because of their personal or family health history. Talk with your healthcare provider about your health history.    Depression All men in this age group At routine exams   T risk for infection Talk with your healthcare provider   Vision All men in this age group Talk with your healthcare provider   Vaccine Who needs it How often   Chickenpox (varicella) All men in this age group who have no record of this infection or vaccine then Td every 10 years   Recombinant zoster vaccine (RZV0) All men in this age group 2 doses; the 2nd dose is given 2 to 6 months after the first. This is given even if you've had shingles before or had a previous zoster live vaccine   Counseling Who needs too high? If so, you could be heading for a stroke or heart attack. This is especially true if you have other risk factors for heart disease. Get smart about cholesterol and your heart disease risk.  One of the first things to know is that having high chol These products increase inflammation in the body and the arteries. Inflamed arteries tend to attract cholesterol deposits and are vulnerable to scarring and damage.  Quitting smoking and other tobacco products can reduce this inflammation and reduce the ris rheumatoid arthritis, psoriasis, or HIV/AIDs  · Menopause before age 36  · High blood pressure during pregnancy (preeclampsia)  · Ethnicity (for example, being from [place])  · Other blood lipid disorders  Your provider will want to help you understand of your risk factors, your healthcare provider will talk with you about your results and what is important for overall health. Fill in your numbers below.   HDL cholesterol:                            LDL cholesterol:                          Total cholest may recommend that you see a registered dietitian for help with diet changes. Changes may include:   · Cutting back on the amount of fat and cholesterol in your meals  · Eating less salt (sodium).  This is especially important if you also have high blood pr smoking  Smoking and other tobacco use can raise cholesterol and make it harder to control. Quitting is tough. But millions of people have given up tobacco for good. You can quit, too! Think about some of the reasons below to quit smoking.  Do any of them m called statins to control their cholesterol. This is in addition to eating a healthy diet and getting regular exercise. The dose or intensity of the statin depends on your risk factors. Statins can help you stay healthy.  They can also help prevent a hear information is not intended as a substitute for professional medical care. Always follow your healthcare professional's instructions. The patient indicates understanding of these issues and agrees to the plan.     Problem List:  Patient Active Pr

## 2021-12-06 NOTE — TELEPHONE ENCOUNTER
Patient called regarding Clobetasol Propionate 0.05 % External Foam, Pt would like to be notified if/when it is authorized by the insurance. Even if not approved by the insurance he will pay out of pocket.

## 2021-12-06 NOTE — TELEPHONE ENCOUNTER
[facility] pharmacy, talked with Ammy Rojas who states they did not receive Clobetasol Rx. Was transferred and left vm of Rx.

## 2021-12-09 ENCOUNTER — TELEPHONE (OUTPATIENT)
Dept: FAMILY MEDICINE CLINIC | Facility: CLINIC | Age: 61
End: 2021-12-09

## 2021-12-09 ENCOUNTER — OFFICE VISIT (OUTPATIENT)
Dept: SURGERY | Facility: CLINIC | Age: 61
End: 2021-12-09
Payer: COMMERCIAL

## 2021-12-09 VITALS
BODY MASS INDEX: 30.36 KG/M2 | WEIGHT: 205 LBS | TEMPERATURE: 98 F | DIASTOLIC BLOOD PRESSURE: 78 MMHG | HEART RATE: 82 BPM | SYSTOLIC BLOOD PRESSURE: 125 MMHG | HEIGHT: 69 IN

## 2021-12-09 DIAGNOSIS — R13.10 ODYNOPHAGIA: ICD-10-CM

## 2021-12-09 DIAGNOSIS — Z12.11 SCREEN FOR COLON CANCER: Primary | ICD-10-CM

## 2021-12-09 PROBLEM — K43.9 VENTRAL HERNIA: Status: RESOLVED | Noted: 2018-06-28 | Resolved: 2021-12-09

## 2021-12-09 PROCEDURE — 3078F DIAST BP <80 MM HG: CPT | Performed by: STUDENT IN AN ORGANIZED HEALTH CARE EDUCATION/TRAINING PROGRAM

## 2021-12-09 PROCEDURE — 3074F SYST BP LT 130 MM HG: CPT | Performed by: STUDENT IN AN ORGANIZED HEALTH CARE EDUCATION/TRAINING PROGRAM

## 2021-12-09 PROCEDURE — 99203 OFFICE O/P NEW LOW 30 MIN: CPT | Performed by: STUDENT IN AN ORGANIZED HEALTH CARE EDUCATION/TRAINING PROGRAM

## 2021-12-09 PROCEDURE — 3008F BODY MASS INDEX DOCD: CPT | Performed by: STUDENT IN AN ORGANIZED HEALTH CARE EDUCATION/TRAINING PROGRAM

## 2021-12-09 RX ORDER — POLYETHYLENE GLYCOL 3350, SODIUM CHLORIDE, SODIUM BICARBONATE, POTASSIUM CHLORIDE 420; 11.2; 5.72; 1.48 G/4L; G/4L; G/4L; G/4L
POWDER, FOR SOLUTION ORAL
Qty: 1 EACH | Refills: 0 | Status: SHIPPED | OUTPATIENT
Start: 2021-12-09 | End: 2021-12-23

## 2021-12-09 RX ORDER — CLOBETASOL PROPIONATE 0.5 MG/G
1 CREAM TOPICAL 2 TIMES DAILY
Qty: 45 G | Refills: 1 | Status: SHIPPED | OUTPATIENT
Start: 2021-12-09 | End: 2021-12-23

## 2021-12-09 NOTE — H&P (VIEW-ONLY)
Office Visit H&P       Active Problems      1. Screen for colon cancer    2. Odynophagia        Chief Complaint   Patient presents with:  Colonoscopy: NP cscope- PT states last cscope was in 2018. PT denies rectal pain or bleeding.  PT states has normal BMs of 12   • REPAIR ING HERNIA,5+Y/O,REDUCIBL     • SINUS SURGERY    02/2008       The family history and social history have been reviewed by me today.     Family History   Problem Relation Age of Onset   • Hypertension Father    • Lipids Father    • Anxiety items noted above in HPI. Allergic/Immuno:  Review of patient's allergies performed.   Cardiovascular:  Negative irregular heartbeat/palpitations  Constitutional:  Negative for decreased activity, irritability or lethargy  Endocrine:  Negative for abnormal [facility]oc's Surgical Case Request (do not use for updates/changes within 48 hrs of procedure)          Standing Status: Future          Standing Expiration Date: 12/9/2022          Order Specific Question: Location          Answer: Rafael Dias risks/benefits of the procedure was discussed with the Patient or Legal Representative          Answer: Yes          Order Specific Question: Procedure benefits, risks, and side effects, likelihood of achieving goals, potential problems during recovery, re

## 2021-12-09 NOTE — H&P
Office Visit H&P       Active Problems      1. Screen for colon cancer    2. Odynophagia        Chief Complaint   Patient presents with:  Colonoscopy: NP cscope- PT states last cscope was in 2018. PT denies rectal pain or bleeding.  PT states has normal BMs of 12   • REPAIR ING HERNIA,5+Y/O,REDUCIBL     • SINUS SURGERY    02/2008       The family history and social history have been reviewed by me today.     Family History   Problem Relation Age of Onset   • Hypertension Father    • Lipids Father    • Anxiety items noted above in HPI. Allergic/Immuno:  Review of patient's allergies performed.   Cardiovascular:  Negative irregular heartbeat/palpitations  Constitutional:  Negative for decreased activity, irritability or lethargy  Endocrine:  Negative for abnormal Ngoc's Surgical Case Request (do not use for updates/changes within 48 hrs of procedure)          Standing Status: Future          Standing Expiration Date: 12/9/2022          Order Specific Question: Location          Answer: Perla Chou risks/benefits of the procedure was discussed with the Patient or Legal Representative          Answer: Yes          Order Specific Question: Procedure benefits, risks, and side effects, likelihood of achieving goals, potential problems during recovery, re

## 2021-12-09 NOTE — TELEPHONE ENCOUNTER
Fax received from [facility] states Clobetasol Propionate 0.05 % External Foam PA has been denied. Please advise.

## 2021-12-20 ENCOUNTER — LAB ENCOUNTER (OUTPATIENT)
Dept: LAB | Age: 61
End: 2021-12-20
Attending: STUDENT IN AN ORGANIZED HEALTH CARE EDUCATION/TRAINING PROGRAM
Payer: COMMERCIAL

## 2021-12-20 DIAGNOSIS — Z12.11 SCREEN FOR COLON CANCER: ICD-10-CM

## 2021-12-23 ENCOUNTER — HOSPITAL ENCOUNTER (OUTPATIENT)
Facility: HOSPITAL | Age: 61
Setting detail: HOSPITAL OUTPATIENT SURGERY
Discharge: HOME | End: 2021-12-23
Attending: STUDENT IN AN ORGANIZED HEALTH CARE EDUCATION/TRAINING PROGRAM | Admitting: STUDENT IN AN ORGANIZED HEALTH CARE EDUCATION/TRAINING PROGRAM
Payer: COMMERCIAL

## 2021-12-23 ENCOUNTER — ANESTHESIA EVENT (OUTPATIENT)
Dept: ENDOSCOPY | Facility: HOSPITAL | Age: 61
End: 2021-12-23
Payer: COMMERCIAL

## 2021-12-23 ENCOUNTER — ANESTHESIA (OUTPATIENT)
Dept: ENDOSCOPY | Facility: HOSPITAL | Age: 61
End: 2021-12-23
Payer: COMMERCIAL

## 2021-12-23 VITALS
WEIGHT: 196 LBS | BODY MASS INDEX: 29.03 KG/M2 | RESPIRATION RATE: 18 BRPM | HEIGHT: 69 IN | OXYGEN SATURATION: 99 % | SYSTOLIC BLOOD PRESSURE: 122 MMHG | TEMPERATURE: 98 F | HEART RATE: 67 BPM | DIASTOLIC BLOOD PRESSURE: 72 MMHG

## 2021-12-23 DIAGNOSIS — R13.10 ODYNOPHAGIA: ICD-10-CM

## 2021-12-23 DIAGNOSIS — Z12.11 SCREEN FOR COLON CANCER: Primary | ICD-10-CM

## 2021-12-23 PROCEDURE — 0DJD8ZZ INSPECTION OF LOWER INTESTINAL TRACT, VIA NATURAL OR ARTIFICIAL OPENING ENDOSCOPIC: ICD-10-PCS | Performed by: STUDENT IN AN ORGANIZED HEALTH CARE EDUCATION/TRAINING PROGRAM

## 2021-12-23 PROCEDURE — 45378 DIAGNOSTIC COLONOSCOPY: CPT | Performed by: STUDENT IN AN ORGANIZED HEALTH CARE EDUCATION/TRAINING PROGRAM

## 2021-12-23 PROCEDURE — 0DB78ZX EXCISION OF STOMACH, PYLORUS, VIA NATURAL OR ARTIFICIAL OPENING ENDOSCOPIC, DIAGNOSTIC: ICD-10-PCS | Performed by: STUDENT IN AN ORGANIZED HEALTH CARE EDUCATION/TRAINING PROGRAM

## 2021-12-23 PROCEDURE — 43251 EGD REMOVE LESION SNARE: CPT | Performed by: STUDENT IN AN ORGANIZED HEALTH CARE EDUCATION/TRAINING PROGRAM

## 2021-12-23 RX ORDER — SODIUM CHLORIDE, SODIUM LACTATE, POTASSIUM CHLORIDE, CALCIUM CHLORIDE 600; 310; 30; 20 MG/100ML; MG/100ML; MG/100ML; MG/100ML
INJECTION, SOLUTION INTRAVENOUS CONTINUOUS
Status: DISCONTINUED | OUTPATIENT
Start: 2021-12-23 | End: 2021-12-23

## 2021-12-23 RX ORDER — NALOXONE HYDROCHLORIDE 0.4 MG/ML
80 INJECTION, SOLUTION INTRAMUSCULAR; INTRAVENOUS; SUBCUTANEOUS AS NEEDED
Status: DISCONTINUED | OUTPATIENT
Start: 2021-12-23 | End: 2021-12-23

## 2021-12-23 RX ADMIN — SODIUM CHLORIDE, SODIUM LACTATE, POTASSIUM CHLORIDE, CALCIUM CHLORIDE: 600; 310; 30; 20 INJECTION, SOLUTION INTRAVENOUS at 08:52:00

## 2021-12-23 RX ADMIN — SODIUM CHLORIDE, SODIUM LACTATE, POTASSIUM CHLORIDE, CALCIUM CHLORIDE: 600; 310; 30; 20 INJECTION, SOLUTION INTRAVENOUS at 09:40:00

## 2021-12-23 NOTE — OPERATIVE REPORT
General Surgery Operative Note  [facility] Location: OR   CSN [id number] MRN [id number]    1960 Age 59year old   Admission Date 2021 Operation Date 2021   Attending Physician Sidney Ríos, Rayne Oneil,  Operating Physician Rey Motley was induced. A comprehensive time-out was performed. A bite block was inserted. The scope was then inserted and passed easily into the esophagus and down into the stomach. There was a gastric polyp in the antrum that was removed using cold snare.   Marcio Robb

## 2021-12-23 NOTE — INTERVAL H&P NOTE
Pre-op Diagnosis: Screen for colon cancer [Z12.11]  Odynophagia [R13.10]    The above referenced H&P was reviewed by Courtney Cummings DO on 12/23/2021, the patient was examined and no significant changes have occurred in the patient's condition since

## 2021-12-23 NOTE — ANESTHESIA PREPROCEDURE EVALUATION
PRE-OP EVALUATION    Patient Name: Hussein Hendrickson    Admit Diagnosis: Screen for colon cancer [Z12.11]  Odynophagia [R13.10]    Pre-op Diagnosis: Screen for colon cancer [Z12.11]  Odynophagia [R13.10]    COLONOSCOPY, ESOPHAGOGASTRODUODENOSCOPY    Anesthes Assisted Prostatectomy @ [facility], Dr. Elver Paz, [place] 3+4, bilateral, T2cNx (no LN in path report), neg margin, no FRANKLYN   • PROSTATE BIOPSIES  2013    thinks had 1 biopsy positive out of 12   • REPAIR ING HERNIA,5+Y/O,REDUCIBL     • SINUS GENESIS

## 2021-12-23 NOTE — OPERATIVE REPORT
General Surgery Operative Note  [facility] Location: OR   CSN [id number] MRN [id number]    1960 Age 59year old   Admission Date 2021 Operation Date 2021   Attending Physician Sidney Ríos, Rayne Oneil,  Operating Physician Rey Motley the cecum and the appendiceal orifice was visualized. There were no other masses or lesions, bowel prep was excellent. The scope was then slowly withdrawn over greater than 8 minutes and the mucosa thoroughly examined.   There are no other masses or lesio

## 2021-12-23 NOTE — ANESTHESIA POSTPROCEDURE EVALUATION
[address] Patient Status:  Hospital Outpatient Surgery   Age/Gender 59year old male MRN [id number]   Location [address] [facility] 28 Attending Jeny Villagomez, [address] [facility] Day # 0 PCP Lanre Cross MD

## 2022-01-18 ENCOUNTER — TELEPHONE (OUTPATIENT)
Dept: FAMILY MEDICINE CLINIC | Facility: CLINIC | Age: 62
End: 2022-01-18

## 2022-01-18 DIAGNOSIS — R25.1 TREMOR: ICD-10-CM

## 2022-01-18 NOTE — TELEPHONE ENCOUNTER
Last office visit:  12/6/21      Requested medication:   propranolol 10 MG Oral Tab      Pharmacy:    [facility] ([facility]), [address] [phone], [phone]

## 2022-01-19 RX ORDER — PROPRANOLOL HYDROCHLORIDE 10 MG/1
10 TABLET ORAL DAILY PRN
Qty: 30 TABLET | Refills: 0 | Status: SHIPPED | OUTPATIENT
Start: 2022-01-19 | End: 2022-01-28

## 2022-01-21 DIAGNOSIS — R25.1 TREMOR: ICD-10-CM

## 2022-01-21 RX ORDER — PROPRANOLOL HYDROCHLORIDE 10 MG/1
TABLET ORAL
Qty: 30 TABLET | Refills: 0 | OUTPATIENT
Start: 2022-01-21

## 2022-01-28 ENCOUNTER — OFFICE VISIT (OUTPATIENT)
Dept: FAMILY MEDICINE CLINIC | Facility: CLINIC | Age: 62
End: 2022-01-28
Payer: COMMERCIAL

## 2022-01-28 VITALS
RESPIRATION RATE: 16 BRPM | HEIGHT: 69 IN | DIASTOLIC BLOOD PRESSURE: 74 MMHG | HEART RATE: 75 BPM | SYSTOLIC BLOOD PRESSURE: 122 MMHG | BODY MASS INDEX: 30.07 KG/M2 | WEIGHT: 203 LBS | OXYGEN SATURATION: 98 %

## 2022-01-28 DIAGNOSIS — L98.9 SCALP LESION: ICD-10-CM

## 2022-01-28 DIAGNOSIS — K21.9 GASTROESOPHAGEAL REFLUX DISEASE, UNSPECIFIED WHETHER ESOPHAGITIS PRESENT: ICD-10-CM

## 2022-01-28 DIAGNOSIS — Z86.16 HISTORY OF COVID-19: ICD-10-CM

## 2022-01-28 DIAGNOSIS — R25.1 TREMOR: Primary | ICD-10-CM

## 2022-01-28 PROCEDURE — 3078F DIAST BP <80 MM HG: CPT | Performed by: FAMILY MEDICINE

## 2022-01-28 PROCEDURE — 3008F BODY MASS INDEX DOCD: CPT | Performed by: FAMILY MEDICINE

## 2022-01-28 PROCEDURE — 3074F SYST BP LT 130 MM HG: CPT | Performed by: FAMILY MEDICINE

## 2022-01-28 PROCEDURE — 99214 OFFICE O/P EST MOD 30 MIN: CPT | Performed by: FAMILY MEDICINE

## 2022-01-28 RX ORDER — PROPRANOLOL HYDROCHLORIDE 10 MG/1
10 TABLET ORAL DAILY PRN
Qty: 90 TABLET | Refills: 1 | Status: SHIPPED | OUTPATIENT
Start: 2022-01-28

## 2022-01-28 RX ORDER — CHLORAL HYDRATE 500 MG
1000 CAPSULE ORAL DAILY
COMMUNITY

## 2022-02-22 RX ORDER — PROPRANOLOL HYDROCHLORIDE 10 MG/1
TABLET ORAL
Qty: 30 TABLET | Refills: 0 | OUTPATIENT
Start: 2022-02-22

## 2022-03-15 PROBLEM — R25.1 TREMOR: Status: ACTIVE | Noted: 2022-03-15

## 2022-03-15 PROBLEM — F41.8 SITUATIONAL ANXIETY: Status: ACTIVE | Noted: 2022-03-15

## 2022-03-15 PROBLEM — Z81.8 FAMILY HISTORY OF DEMENTIA: Status: ACTIVE | Noted: 2022-03-15

## 2022-03-15 PROBLEM — F51.04 PSYCHOPHYSIOLOGICAL INSOMNIA: Status: ACTIVE | Noted: 2022-03-15

## 2022-07-13 ENCOUNTER — OFFICE VISIT (OUTPATIENT)
Dept: FAMILY MEDICINE CLINIC | Facility: CLINIC | Age: 62
End: 2022-07-13
Payer: COMMERCIAL

## 2022-07-13 VITALS
TEMPERATURE: 98 F | WEIGHT: 200 LBS | HEART RATE: 99 BPM | DIASTOLIC BLOOD PRESSURE: 70 MMHG | SYSTOLIC BLOOD PRESSURE: 125 MMHG | OXYGEN SATURATION: 100 % | BODY MASS INDEX: 29.62 KG/M2 | HEIGHT: 69 IN

## 2022-07-13 DIAGNOSIS — H10.9 CONJUNCTIVITIS OF RIGHT EYE, UNSPECIFIED CONJUNCTIVITIS TYPE: Primary | ICD-10-CM

## 2022-07-13 PROCEDURE — 3074F SYST BP LT 130 MM HG: CPT | Performed by: NURSE PRACTITIONER

## 2022-07-13 PROCEDURE — 3008F BODY MASS INDEX DOCD: CPT | Performed by: NURSE PRACTITIONER

## 2022-07-13 PROCEDURE — 99213 OFFICE O/P EST LOW 20 MIN: CPT | Performed by: NURSE PRACTITIONER

## 2022-07-13 PROCEDURE — 3078F DIAST BP <80 MM HG: CPT | Performed by: NURSE PRACTITIONER

## 2022-07-13 RX ORDER — OFLOXACIN 3 MG/ML
2 SOLUTION/ DROPS OPHTHALMIC 4 TIMES DAILY
Qty: 1 EACH | Refills: 0 | Status: SHIPPED | OUTPATIENT
Start: 2022-07-13 | End: 2022-07-20

## (undated) DIAGNOSIS — R25.1 TREMOR: ICD-10-CM

## (undated) DEVICE — DRAIN RELIAVAC W/DRN MED 1/8

## (undated) DEVICE — SUTURE ETHILON 3-0 FS-1

## (undated) DEVICE — DRAPE C-ARM UNIVERSAL

## (undated) DEVICE — FORCEP RADIAL JAW 4

## (undated) DEVICE — VIOLET BRAIDED (POLYGLACTIN 910), SYNTHETIC ABSORBABLE SUTURE: Brand: COATED VICRYL

## (undated) DEVICE — Device

## (undated) DEVICE — SUTURE VICRYL 0 CP-2

## (undated) DEVICE — PIN STEINMAN SMOOTH 5/64 9

## (undated) DEVICE — SUTURE ETHILON 3-0 FSL

## (undated) DEVICE — 3M™ STERI-STRIP™ REINFORCED ADHESIVE SKIN CLOSURES, R1547, 1/2 IN X 4 IN (12 MM X 100 MM), 6 STRIPS/ENVELOPE: Brand: 3M™ STERI-STRIP™

## (undated) DEVICE — ENDOSCOPY PACK - LOWER: Brand: MEDLINE INDUSTRIES, INC.

## (undated) DEVICE — LAPAROTOMY CDS: Brand: MEDLINE INDUSTRIES, INC.

## (undated) DEVICE — SUTURE VICRYL 3-0 SH

## (undated) DEVICE — DRILL BIT SYNT 3.5X110 310.35

## (undated) DEVICE — GAMMEX® PI HYBRID SIZE 8, STERILE POWDER-FREE SURGICAL GLOVE, POLYISOPRENE AND NEOPRENE BLEND: Brand: GAMMEX

## (undated) DEVICE — 1200CC GUARDIAN II: Brand: GUARDIAN

## (undated) DEVICE — GOWN,SIRUS,FABRIC-REINFORCED,LARGE: Brand: MEDLINE

## (undated) DEVICE — PIN STEINMAN SMOOTH .062 9

## (undated) DEVICE — BIT DRL 140/115MM 2MM 3 FLT 2

## (undated) DEVICE — GLOVE BIOGEL M SURG SZ 6-1/2

## (undated) DEVICE — KENDALL SCD EXPRESS SLEEVES, KNEE LENGTH, MEDIUM: Brand: KENDALL SCD

## (undated) DEVICE — GAMMEX® PI HYBRID SIZE 8.5, STERILE POWDER-FREE SURGICAL GLOVE, POLYISOPRENE AND NEOPRENE BLEND: Brand: GAMMEX

## (undated) DEVICE — LOWER EXTREMITY CDS-LF: Brand: MEDLINE INDUSTRIES, INC.

## (undated) DEVICE — 3M™ RED DOT™ MONITORING ELECTRODE WITH FOAM TAPE AND STICKY GEL, 50/BAG, 20/CASE, 72/PLT 2570: Brand: RED DOT™

## (undated) DEVICE — NON-ADHERENT STRIPS,OIL EMULSION: Brand: CURITY

## (undated) DEVICE — SNARE CAPTI HEX STIFF SMALL

## (undated) DEVICE — PROXIMATE PLUS MD MULTI-DIRECTIONAL RELEASE SKIN STAPLERS CONTAINS 35 STAINLESS STEEL STAPLES APPROXIMATE CLOSED DIMENSIONS: 6.9MM X 3.9MM WIDE: Brand: PROXIMATE

## (undated) DEVICE — PAD SACRAL SPAN AID

## (undated) DEVICE — SUTURE VICRYL 3-0 CT-2

## (undated) DEVICE — TRAP 4 CPTR CHMBR N EZ INLN

## (undated) DEVICE — REM POLYHESIVE ADULT PATIENT RETURN ELECTRODE: Brand: VALLEYLAB

## (undated) DEVICE — SPLINT PLASTER 5

## (undated) DEVICE — UNDYED BRAIDED (POLYGLACTIN 910), SYNTHETIC ABSORBABLE SUTURE: Brand: COATED VICRYL

## (undated) DEVICE — WIRE K SMALL .062
Type: IMPLANTABLE DEVICE | Site: ANKLE | Status: NON-FUNCTIONAL
Removed: 2019-12-27

## (undated) DEVICE — CHLORAPREP 26ML APPLICATOR

## (undated) DEVICE — 3M(TM) MICROPORE TAPE DISPENSER 1535-2: Brand: 3M™ MICROPORE™

## (undated) DEVICE — ADHESIVE MASTISOL 2/3CC VL

## (undated) DEVICE — #15 STERILE STAINLESS BLADE: Brand: STERILE STAINLESS BLADES

## (undated) DEVICE — STANDARD HYPODERMIC NEEDLE,POLYPROPYLENE HUB: Brand: MONOJECT

## (undated) DEVICE — Device: Brand: DEFENDO AIR/WATER/SUCTION AND BIOPSY VALVE

## (undated) DEVICE — FILTERLINE NASAL ADULT O2/CO2

## (undated) DEVICE — DRILL BIT SYNT 2.5X110 310.25

## (undated) DEVICE — SOL  .9 1000ML BTL

## (undated) DEVICE — SUTURE ETHIBOND EXCEL 0 MO-7

## (undated) NOTE — LETTER
18    Patient: Singh Scott  : 1960 Visit date: 2018    Dear  Dr. Beth Almodovar PA-C,    Thank you for referring Singh Scott to my practice. Please find my assessment and plan below.                 Assessment   Encounter for colonoscop

## (undated) NOTE — LETTER
18    Patient: Samir Mckinley  : 1960 Visit date: 2018    Dear  Dr. Laly Mg MD,    Thank you for referring Samir Mckinley to my practice. Please find my assessment and plan below.                 Assessment   Ventral hernia without o

## (undated) NOTE — LETTER
02/01/19        [address]  [address]      Dear Horacio Hartman,    [address] [facility] records indicate that you have outstanding lab work and or testing that was ordered for you and has not yet been completed:        Vitamin D, 25-Hydroxy [E]

## (undated) NOTE — ED AVS SNAPSHOT
Augustin Soto   MRN: [id number]    Department:  [facility] Emergency Department in [place]   Date of Visit:  12/14/2019           Disclosure     Insurance plans vary and the physician(s) referred by the ER may not be covered by your plan.  Please conta tell this physician (or your personal doctor if your instructions are to return to your personal doctor) about any new or lasting problems. The primary care or specialist physician will see patients referred from the [facility] Emergency Department.  Micah Rivers

## (undated) NOTE — Clinical Note
Small abdominal abscess x 1 week. Possible imbedded tick? Started him on doxy, and follow up with you for possible I+D. Send culture. Thanks!